# Patient Record
Sex: FEMALE | Race: WHITE | Employment: UNEMPLOYED | URBAN - METROPOLITAN AREA
[De-identification: names, ages, dates, MRNs, and addresses within clinical notes are randomized per-mention and may not be internally consistent; named-entity substitution may affect disease eponyms.]

---

## 2019-06-12 ENCOUNTER — HOSPITAL ENCOUNTER (OUTPATIENT)
Dept: ONCOLOGY | Age: 49
Discharge: HOME | End: 2019-06-12

## 2019-06-12 ENCOUNTER — HOSPITAL ENCOUNTER (OUTPATIENT)
Dept: LAB | Age: 49
Discharge: HOME | End: 2019-06-12
Payer: MEDICARE

## 2019-06-12 ENCOUNTER — OFFICE VISIT (OUTPATIENT)
Dept: ONCOLOGY | Age: 49
End: 2019-06-12

## 2019-06-12 VITALS
TEMPERATURE: 97.2 F | RESPIRATION RATE: 16 BRPM | DIASTOLIC BLOOD PRESSURE: 95 MMHG | OXYGEN SATURATION: 99 % | SYSTOLIC BLOOD PRESSURE: 143 MMHG | BODY MASS INDEX: 41.62 KG/M2 | WEIGHT: 281 LBS | HEART RATE: 117 BPM | HEIGHT: 69 IN

## 2019-06-12 DIAGNOSIS — I82.90 VENOUS THROMBOEMBOLISM: ICD-10-CM

## 2019-06-12 DIAGNOSIS — D47.2 MONOCLONAL GAMMOPATHY OF UNDETERMINED SIGNIFICANCE: ICD-10-CM

## 2019-06-12 DIAGNOSIS — D68.61 ANTIPHOSPHOLIPID SYNDROME (HCC): Primary | ICD-10-CM

## 2019-06-12 DIAGNOSIS — D68.61 ANTIPHOSPHOLIPID SYNDROME (HCC): ICD-10-CM

## 2019-06-12 LAB
ALBUMIN SERPL-MCNC: 3.7 G/DL (ref 3.4–5)
ALBUMIN/GLOB SERPL: 1.2 {RATIO} (ref 0.8–1.7)
ALP SERPL-CCNC: 124 U/L (ref 45–117)
ALT SERPL-CCNC: 18 U/L (ref 13–56)
ANION GAP SERPL CALC-SCNC: 9 MMOL/L (ref 3–18)
AST SERPL-CCNC: 11 U/L (ref 15–37)
BASO+EOS+MONOS # BLD AUTO: 0.9 K/UL (ref 0–2.3)
BASO+EOS+MONOS NFR BLD AUTO: 11 % (ref 0.1–17)
BILIRUB SERPL-MCNC: 0.6 MG/DL (ref 0.2–1)
BUN SERPL-MCNC: 27 MG/DL (ref 7–18)
BUN/CREAT SERPL: 19 (ref 12–20)
CALCIUM SERPL-MCNC: 8.6 MG/DL (ref 8.5–10.1)
CHLORIDE SERPL-SCNC: 108 MMOL/L (ref 100–108)
CO2 SERPL-SCNC: 23 MMOL/L (ref 21–32)
CREAT SERPL-MCNC: 1.42 MG/DL (ref 0.6–1.3)
DIFFERENTIAL METHOD BLD: ABNORMAL
ERYTHROCYTE [DISTWIDTH] IN BLOOD BY AUTOMATED COUNT: 13.7 % (ref 11.5–14.5)
GLOBULIN SER CALC-MCNC: 3 G/DL (ref 2–4)
GLUCOSE SERPL-MCNC: 86 MG/DL (ref 74–99)
HCT VFR BLD AUTO: 35.2 % (ref 36–48)
HGB BLD-MCNC: 11.4 G/DL (ref 12–16)
LYMPHOCYTES # BLD: 1.9 K/UL (ref 1.1–5.9)
LYMPHOCYTES NFR BLD: 22 % (ref 14–44)
MCH RBC QN AUTO: 29.2 PG (ref 25–35)
MCHC RBC AUTO-ENTMCNC: 32.4 G/DL (ref 31–37)
MCV RBC AUTO: 90.3 FL (ref 78–102)
NEUTS SEG # BLD: 6 K/UL (ref 1.8–9.5)
NEUTS SEG NFR BLD: 68 % (ref 40–70)
PLATELET # BLD AUTO: 293 K/UL (ref 140–440)
POTASSIUM SERPL-SCNC: 4 MMOL/L (ref 3.5–5.5)
PROT SERPL-MCNC: 6.7 G/DL (ref 6.4–8.2)
RBC # BLD AUTO: 3.9 M/UL (ref 4.1–5.1)
SODIUM SERPL-SCNC: 140 MMOL/L (ref 136–145)
WBC # BLD AUTO: 8.8 K/UL (ref 4.5–13)

## 2019-06-12 PROCEDURE — 85303 CLOT INHIBIT PROT C ACTIVITY: CPT

## 2019-06-12 PROCEDURE — 85306 CLOT INHIBIT PROT S FREE: CPT

## 2019-06-12 PROCEDURE — 85270 CLOT FACTOR XI PTA: CPT

## 2019-06-12 PROCEDURE — 80053 COMPREHEN METABOLIC PANEL: CPT

## 2019-06-12 PROCEDURE — 85302 CLOT INHIBIT PROT C ANTIGEN: CPT

## 2019-06-12 PROCEDURE — 85250 CLOT FACTOR IX PTC/CHRSTMAS: CPT

## 2019-06-12 PROCEDURE — 85305 CLOT INHIBIT PROT S TOTAL: CPT

## 2019-06-12 PROCEDURE — 85300 ANTITHROMBIN III ACTIVITY: CPT

## 2019-06-12 PROCEDURE — 36415 COLL VENOUS BLD VENIPUNCTURE: CPT

## 2019-06-12 PROCEDURE — 86147 CARDIOLIPIN ANTIBODY EA IG: CPT

## 2019-06-12 PROCEDURE — 83883 ASSAY NEPHELOMETRY NOT SPEC: CPT

## 2019-06-12 PROCEDURE — 84165 PROTEIN E-PHORESIS SERUM: CPT

## 2019-06-12 NOTE — PATIENT INSTRUCTIONS
Learning About Monoclonal Gammopathy of Unknown Significance (MGUS) What is MGUS? Monoclonal gammopathy of unknown significance (MGUS) is a blood condition. The blood is made of many kinds of cells, including red blood cells, platelets, and white blood cells. With MGUS, a type of white blood cell called a plasma cell makes too much of the \"M\" (for \"monoclonal\") protein in the blood. Most people with MGUS are fine for many years. MGUS seldom causes symptoms or major health problems. In rare cases, MGUS may turn into multiple myeloma. This is a cancer of plasma cells in the blood that can cause bone weakness. Some people with MGUS may also have a higher risk for osteoporosis, in which bones become thin and weak. MGUS is sometimes seen in younger people, but is more common in people as they get older. It's seen most often in those over the age of 79. How is MGUS diagnosed? MGUS is often found by chance when blood tests are done for other reasons. If you have high levels of a certain protein in your blood, your doctor may order more tests. Blood tests can help identify the protein. The protein is sometimes found in the urine, so you may get a urine test too. Other tests may be done if your doctor thinks you might have a medical problem. In some cases, a bone marrow biopsy may be done to rule out a problem like multiple myeloma. How is it treated? Most people with MGUS don't need any treatment. But you may need regular physical exams, blood tests, and urine tests to make sure that MGUS isn't progressing to a medical problem. Follow-up care is a key part of your treatment and safety. Be sure to make and go to all appointments, and call your doctor if you are having problems. It's also a good idea to know your test results and keep a list of the medicines you take. Where can you learn more? Go to http://andrew-yanick.info/. Enter A917 in the search box to learn more about \"Learning About Monoclonal Gammopathy of Unknown Significance (MGUS). \" 
Current as of: May 6, 2018 Content Version: 11.9 © 7104-6300 [employer]. Care instructions adapted under license by [employer] (which disclaims liability or warranty for this information). If you have questions about a medical condition or this instruction, always ask your healthcare professional. [place] [address] any warranty or liability for your use of this information.

## 2019-06-13 PROBLEM — S42.209A CLOSED FRACTURE OF PROXIMAL END OF HUMERUS: Status: ACTIVE | Noted: 2019-01-11

## 2019-06-13 PROBLEM — S82.839A FRACTURE OF DISTAL END OF FIBULA: Status: ACTIVE | Noted: 2019-01-11

## 2019-06-13 RX ORDER — ONDANSETRON 4 MG/1
TABLET, ORALLY DISINTEGRATING ORAL
Status: ON HOLD | COMMUNITY
Start: 2019-04-24 | End: 2020-08-31

## 2019-06-13 RX ORDER — NALOXONE HYDROCHLORIDE 4 MG/.1ML
SPRAY NASAL
Status: ON HOLD | COMMUNITY
Start: 2019-03-11 | End: 2020-08-31

## 2019-06-13 RX ORDER — BUPRENORPHINE 15 UG/H
PATCH TRANSDERMAL
COMMUNITY
End: 2020-09-09

## 2019-06-13 RX ORDER — BUPRENORPHINE 15 UG/H
PATCH TRANSDERMAL
Refills: 0 | Status: ON HOLD | COMMUNITY
Start: 2019-06-02 | End: 2020-08-31 | Stop reason: SDUPTHER

## 2019-06-13 RX ORDER — IBUPROFEN AND FAMOTIDINE 26.6; 8 MG/1; MG/1
TABLET, FILM COATED ORAL
Status: ON HOLD | COMMUNITY
End: 2020-08-31

## 2019-06-13 RX ORDER — OSPEMIFENE 60 MG/1
TABLET, FILM COATED ORAL
COMMUNITY
Start: 2019-04-15 | End: 2019-10-02 | Stop reason: SDUPTHER

## 2019-06-13 RX ORDER — PREDNISONE 5 MG/1
TABLET ORAL
COMMUNITY

## 2019-06-13 RX ORDER — PREDNISONE 5 MG/1
TABLET ORAL
COMMUNITY
Start: 2019-06-07

## 2019-06-13 RX ORDER — OXYCODONE HYDROCHLORIDE 5 MG/1
TABLET ORAL
Status: ON HOLD | COMMUNITY
Start: 2019-04-24 | End: 2020-08-31

## 2019-06-13 RX ORDER — CARVEDILOL 12.5 MG/1
TABLET ORAL 2 TIMES DAILY
Status: ON HOLD | COMMUNITY
Start: 2019-06-11 | End: 2020-09-09 | Stop reason: SDUPTHER

## 2019-06-13 RX ORDER — ACETAMINOPHEN 160 MG/5ML
SUSPENSION, ORAL (FINAL DOSE FORM) ORAL
Status: ON HOLD | COMMUNITY
Start: 2019-04-24 | End: 2020-08-31

## 2019-06-13 RX ORDER — ALBUTEROL SULFATE 90 UG/1
AEROSOL, METERED RESPIRATORY (INHALATION)
COMMUNITY

## 2019-06-13 RX ORDER — BENZONATATE 100 MG/1
CAPSULE ORAL
Status: ON HOLD | COMMUNITY
End: 2021-03-19

## 2019-06-13 RX ORDER — OXYCODONE AND ACETAMINOPHEN 10; 325 MG/1; MG/1
TABLET ORAL
Status: ON HOLD | COMMUNITY
End: 2020-08-31

## 2019-06-13 RX ORDER — TRIAZOLAM 0.25 MG/1
TABLET ORAL
COMMUNITY
End: 2019-10-02 | Stop reason: SDUPTHER

## 2019-06-13 RX ORDER — MYCOPHENOLATE MOFETIL 500 MG/1
1000 TABLET ORAL 2 TIMES DAILY
COMMUNITY

## 2019-06-13 RX ORDER — DICLOFENAC SODIUM 10 MG/G
GEL TOPICAL
COMMUNITY
Start: 2019-06-07 | End: 2019-10-02 | Stop reason: SDUPTHER

## 2019-06-13 RX ORDER — CEPHALEXIN 500 MG/1
CAPSULE ORAL
Status: ON HOLD | COMMUNITY
End: 2020-08-31

## 2019-06-13 RX ORDER — DIAZEPAM 5 MG/1
TABLET ORAL
Status: ON HOLD | COMMUNITY
Start: 2019-03-22 | End: 2020-08-31

## 2019-06-13 RX ORDER — CYCLOSPORINE 100 MG/1
75 CAPSULE, GELATIN COATED ORAL 2 TIMES DAILY
COMMUNITY

## 2019-06-13 RX ORDER — ATENOLOL 25 MG/1
TABLET ORAL
Status: ON HOLD | COMMUNITY
End: 2020-08-31

## 2019-06-13 RX ORDER — PANTOPRAZOLE SODIUM 40 MG/1
TABLET, DELAYED RELEASE ORAL
COMMUNITY

## 2019-06-13 RX ORDER — TRIAZOLAM 0.25 MG/1
TABLET ORAL
COMMUNITY
Start: 2019-06-10 | End: 2021-02-23 | Stop reason: CLARIF

## 2019-06-13 RX ORDER — APIXABAN 5 MG/1
TABLET, FILM COATED ORAL
COMMUNITY
Start: 2019-06-04 | End: 2019-10-02 | Stop reason: SDUPTHER

## 2019-06-13 RX ORDER — ACETAMINOPHEN 500 MG/1
TABLET, FILM COATED ORAL
Status: ON HOLD | COMMUNITY
Start: 2019-04-24 | End: 2020-08-31

## 2019-06-13 RX ORDER — DICLOFENAC SODIUM 10 MG/G
GEL TOPICAL
Status: ON HOLD | COMMUNITY
End: 2020-08-31

## 2019-06-13 NOTE — PROGRESS NOTES
Hematology/Oncology Consultation Note    Name: Valentine Gutierres  Date: 2019  : 1970    No primary care provider on file. Ms. Ej Castellon  is a 46 y.o. woman with a history of recurrent venous thromboembolism and antiphospholipid syndrome. She is here to establish continuity of care. Subjective:     Chief complaint: Recurrent venous thromboembolism and antiphospholipid syndrome. The patient also has a monoclonal gammopathy    History of present illness:  Ms. Ej Castellon is a 67-year-old woman who states that she was diagnosed with spontaneous right lower extremity DVT and pulmonary emboli in . At the time she states the test revealed that she had antiphospholipid syndrome. She states that she had an evaluation done initially at [facility] and subsequently was found to have lupus and fibromyalgia. She has been on chronic anticoagulation since that time. 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  Currently she is taking Eliquis 5 mg twice daily. Since the diagnosis of antiphospholipid syndrome she was diagnosed with monoclonal gammopathy of undetermined significance. This is been monitored at regular intervals in the past.  She occasionally does have some bruising of the lower extremities but has not experienced any overt bleeding. She has not been on any medication for fibromyalgia. Today she is here to establish continuity care. She cannot recall whether or not she has had a complete evaluation to rule out a genetically inherited thrombophilia. History reviewed. No pertinent past medical history. Not on File    History reviewed. No pertinent surgical history.     Social History     Socioeconomic History    Marital status: UNKNOWN     Spouse name: Not on file    Number of children: Not on file    Years of education: Not on file    Highest education level: Not on file   Occupational History    Not on file Social Needs    Financial resource strain: Not on file    Food insecurity:     Worry: Not on file     Inability: Not on file    Transportation needs:     Medical: Not on file     Non-medical: Not on file   Tobacco Use    Smoking status: Not on file   Substance and Sexual Activity    Alcohol use: Not on file    Drug use: Not on file    Sexual activity: Not on file   Lifestyle    Physical activity:     Days per week: Not on file     Minutes per session: Not on file    Stress: Not on file   Relationships    Social connections:     Talks on phone: Not on file     Gets together: Not on file     Attends [employer] service: Not on file     Active member of club or organization: Not on file     Attends meetings of clubs or organizations: Not on file     Relationship status: Not on file    Intimate partner violence:     Fear of current or ex partner: Not on file     Emotionally abused: Not on file     Physically abused: Not on file     Forced sexual activity: Not on file   Other Topics Concern    Not on file   Social History Narrative    Not on file       History reviewed. No pertinent family history. Review of Systems    General ROS:The patient has complaints of some bruising in the lower extremities and occasional fatigue. She has mild discomfort due to her underlying fibromyalgia. Psychological ROS: patient denies having any psychological symptoms such as hallucinations, depression or anxiety. Ophthalmic ROS:the patient denies having any visual impairment or eye discomfort. ENT ROS: there are no abnormalities reported. Allergy and Immunology ROS:the patient denies having any seasonal allergies or allergies to medications other than those already outlined above. Hematological and Lymphatic ROS: the patient denies having bleeding but does have occasional bruising in the lower extremities. Endocrine ROS: the patient denies having any heat or cold intolerance.   There is no history of diabetes or thyroid disorders. Breast ROS: the patient denies having any history of breast mass, nipple discharge, or lumps. Respiratory ROS:the patient denies having any cough, shortness of breath, or dyspnea on exertion. Cardiovascular ROS: there are no complaints of chest pain, palpitations, chest pounding, or dyspnea on exertion. Gastrointestinal ROS: the patient denies having nausea, emesis, diarrhea, constipation, or blood in the stool. Genito-Urinary ROS: the patient denies having urinary urgency, frequency, or dysuria. Musculoskeletal ROS: with the exception of mild arthralgias the patient has no other musculoskeletal complaints. Neurological ROS: the patient denies having any numbness, tingling, or neurologic deficits. Dermatological ROS:patient denies having any unexplained rash, skin ulcerations, or hives. Objective:     Visit Vitals  BP (!) 143/95   Pulse (!) 117   Temp 97.2 °F (36.2 °C) (Oral)   Resp 16   Ht 5' 9\" (1.753 m)   Wt 127.5 kg (281 lb)   SpO2 99%   BMI 41.50 kg/m²        ECOGPS=0  Physical Exam:   Gen. Appearance: the patient is in no acute distress. Skin: There is no evidence of bruise or rash. HEENT: The head is normocephalic and atraumatic. The conjunctiva and sclera are clear. Pupils are equal, round, reactive to light, and accommodation. The extraocular movements are intact. ENT reveals no oral mucosal lesions or ulcerations. Neck: Supple without lymphadenopathy or thyromegaly. Anterior chest wall and breast: Benign lungs: Clear to auscultation and percussion; there are no wheezes or rhonchi. Heart: Regular rate and rhythm; there are no murmurs, gallops, or rubs. Abdomen: Bowel sounds are present and normal.  There is no guarding, tenderness, or hepatosplenomegaly. Extremities: There is no clubbing, cyanosis, or edema. Neurologic: There are no focal neurologic deficits. Lymphatics: There is no palpable peripheral lymphadenopathy.     Lab data:  Lab data from 1/23/2018 shows a CBC with a WBC count 7.59, hemoglobin 11.9 g/dL, hematocrit 36.8%, MCV 93.9, and the platelet count was 764,691. SPEP from 6/7/2018 shows a monoclonal paraprotein level of 0.3 g/dL. The quantitative immunoglobulin showed an IgA level of 5 mg/dL, IgG was 637 mg/dL, and IgM was 141 mg/dL. Assessment:   Antiphospholipid syndrome: The patient reports that she is currently on chronic long-term anticoagulation in the form of Eliquis 5 mg twice daily. She occasionally has some bruising but no explained to the patient that we will need to evaluate her for other genetic inherited thrombophilic disorders since she cannot recall ever having this done. We have no outside records to confirm. Monoclonal gammopathy of undetermined significance: The most recent serum protein electrophoresis shows a stable monoclonal paraprotein level of approximately 0.3 g/dL. Plan:   Antiphospholipid syndrome: I have advised the patient to continue taking the Eliquis 5 mg twice daily. At this time I will do a complete assessment for genetically inherited thrombophilia. The Antithrombin panel, protein C profile, protein S profile, and homocysteine level will be obtained. Anticardiolipin antibodies including IgG, IgM, and IgA will be ordered. Factor IX and factor XI activity along with DNA analysis for factor V Leiden mutation, factor II mutation, and mutations in the MTHFR will be completed. I will plan to see the patient back in clinic in about 3 weeks to review lab data. Monoclonal gammopathy of undetermined significance. At this time I will recheck her SPEP and if there is a significant increase in her monoclonal paraprotein level she may be required to undergo a bone marrow biopsy to rule out progression to multiple myeloma. Follow-up in 3 weeks to review lab data.     Orders Placed This Encounter    COMPLETE CBC & AUTO DIFF WBC    METABOLIC PANEL, COMPREHENSIVE     Standing Status:   Future     Number of Occurrences:   1     Standing Expiration Date:   6/12/2020    PROTEIN ELECTROPHORESIS     Standing Status:   Future     Number of Occurrences:   1     Standing Expiration Date:   6/12/2020    PROTEIN S, FUNCTIONAL     Standing Status:   Future     Number of Occurrences:   1     Standing Expiration Date:   6/12/2020    FACTOR V LEIDEN     Standing Status:   Future     Number of Occurrences:   1     Standing Expiration Date:   6/12/2020    FACTOR II  DNA ANALYSIS     Standing Status:   Future     Number of Occurrences:   1     Standing Expiration Date:   6/12/2020    FREE LIGHT CHAINS, KAPPA/LAMBDA, QT     Standing Status:   Future     Number of Occurrences:   1     Standing Expiration Date:   6/12/2020    PROTEIN S ANTIGEN     Standing Status:   Future     Number of Occurrences:   1     Standing Expiration Date:   6/12/2020    PROTEIN C, TOTAL     Standing Status:   Future     Number of Occurrences:   1     Standing Expiration Date:   6/12/2020    PROTEIN C ACTIVITY     Standing Status:   Future     Number of Occurrences:   1     Standing Expiration Date:   6/12/2020    MTHFR MUTATION DETECTION     Standing Status:   Future     Number of Occurrences:   1     Standing Expiration Date:   6/12/2020    ANTITHROMBIN III PANEL     Standing Status:   Future     Number of Occurrences:   1     Standing Expiration Date:   6/12/2020    CARDIOLIPIN AB PANEL     Standing Status:   Future     Number of Occurrences:   1     Standing Expiration Date:   6/12/2020    FACTOR IX ACTIVITY     Standing Status:   Future     Number of Occurrences:   1     Standing Expiration Date:   6/11/2020    FACTOR XI ACTIVITY     Standing Status:   Future     Number of Occurrences:   1     Standing Expiration Date:   6/12/2020    InHouse CBC ([employer])     Standing Status:   Future     Number of Occurrences:   1     Standing Expiration Date:   6/19/2019           Shahrzad Cordero MD  6/12/2019      Please note:  This document has been produced using voice recognition software. Unrecognized errors in transcription may be present.

## 2019-06-14 LAB
ALBUMIN SERPL ELPH-MCNC: 3.4 G/DL (ref 2.9–4.4)
ALBUMIN/GLOB SERPL: 1.2 {RATIO} (ref 0.7–1.7)
ALPHA1 GLOB SERPL ELPH-MCNC: 0.2 G/DL (ref 0–0.4)
ALPHA2 GLOB SERPL ELPH-MCNC: 0.8 G/DL (ref 0.4–1)
B-GLOBULIN SERPL ELPH-MCNC: 0.9 G/DL (ref 0.7–1.3)
CARDIOLIPIN IGA SER IA-ACNC: <9 APL U/ML (ref 0–11)
CARDIOLIPIN IGG SER IA-ACNC: <9 GPL U/ML (ref 0–14)
CARDIOLIPIN IGM SER IA-ACNC: 16 MPL U/ML (ref 0–12)
GAMMA GLOB SERPL ELPH-MCNC: 0.8 G/DL (ref 0.4–1.8)
GLOBULIN SER CALC-MCNC: 2.8 G/DL (ref 2.2–3.9)
KAPPA LC FREE SER-MCNC: 22 MG/L (ref 3.3–19.4)
KAPPA LC FREE/LAMBDA FREE SER: 0.25 {RATIO} (ref 0.26–1.65)
LAMBDA LC FREE SERPL-MCNC: 87.7 MG/L (ref 5.7–26.3)
M PROTEIN SERPL ELPH-MCNC: 0.3 G/DL
PROT SERPL-MCNC: 6.2 G/DL (ref 6–8.5)

## 2019-06-15 LAB
AT III AG PPP IA-ACNC: 117 % (ref 72–124)
AT III PPP CHRO-ACNC: 146 % (ref 75–135)
FACT IX ACT/NOR PPP: 136 % (ref 60–177)
FACT XI ACT/NOR PPP: 119 % (ref 60–150)
PROT C AG PPP IA-ACNC: 104 % (ref 60–150)
PROT C PPP-ACNC: 126 % (ref 73–180)
PROT S ACT/NOR PPP: 104 % (ref 63–140)
PROT S ACT/NOR PPP: 131 % (ref 57–157)
PROT S PPP-ACNC: 114 % (ref 60–150)

## 2019-06-17 LAB
F2 GENE MUT ANL BLD/T: NORMAL
F5 GENE MUT ANL BLD/T: NORMAL

## 2019-06-24 LAB — MTHFR GENE MUT ANL BLD/T: NORMAL

## 2019-06-26 ENCOUNTER — OFFICE VISIT (OUTPATIENT)
Dept: ONCOLOGY | Age: 49
End: 2019-06-26

## 2019-06-26 VITALS
WEIGHT: 281 LBS | SYSTOLIC BLOOD PRESSURE: 137 MMHG | TEMPERATURE: 99.1 F | OXYGEN SATURATION: 98 % | HEART RATE: 86 BPM | HEIGHT: 69 IN | RESPIRATION RATE: 16 BRPM | BODY MASS INDEX: 41.62 KG/M2 | DIASTOLIC BLOOD PRESSURE: 82 MMHG

## 2019-06-26 DIAGNOSIS — I82.90 VENOUS THROMBOEMBOLISM: ICD-10-CM

## 2019-06-26 DIAGNOSIS — D68.61 ANTIPHOSPHOLIPID SYNDROME (HCC): Primary | ICD-10-CM

## 2019-06-26 DIAGNOSIS — D47.2 MONOCLONAL GAMMOPATHY OF UNDETERMINED SIGNIFICANCE: ICD-10-CM

## 2019-06-26 NOTE — PROGRESS NOTES
Hematology/medical oncology progress note    6/26/2019  Ed Berrios  YOB: 1970    Diagnosis: Antiphospholipid syndrome and recurrent DVT    Ms. Ej Castellon is a 47-year-old woman who has a history of recurrent deep vein thrombosis. She also has an antiphospholipid syndrome. Patient has previously undergone a kidney transplant as well. Recently she presented here to establish continuity of care. On 6/12/2018 the CBC revealed that she had a WBC count of 8.8, hemoglobin was 11.4 g/dL, hematocrit was 35.2%, and a platelet count was 767,719. The comprehensive metabolic panel showed that her BUN was 27, creatinine was 1.42, and her liver enzymes were normal except for slightly elevated alkaline phosphatase of 124. Thrombophilia work-up showed that Antithrombin activity and antigen levels were normal at 146% and 117% respectively. Anticardiolipin antibody IgG was negative at less than 9, IgA 80 was negative for less than 9 and the IgM was indeterminate at 16. Factor XI activity was normal at 119%. A DNA analysis for factor II abnormality and factor V abnormalities were both negative. The factor IX activity was 136%. The free kappa light chains were elevated at 22 mg/L and the free lambda light chains were elevated at 87.7 mg/L. The kappa/lambda ratio was 0.25. 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 This combination has no risk for increased venous thromboembolism. Her protein C functional level was normal at 126%, the protein C antigen was 104%, and the functional protein S was 104% with a total protein S of 114% and a free protein S of 131%. She has a known monoclonal gammopathy and the SPEP revealed that she has a monoclonal paraprotein level that measures at 0.3 g/dL.   I have explained to the patient that although she does not have a genetically inherited ongoing thrombophilic disorder. Her antiphospholipid antibody level is indeterminate. She does have a monoclonal gammopathy and a history of recurrent DVT. For these reasons she should remain on anticoagulation indefinitely. She will continue to take Eliquis 5 mg p.o. twice daily. I will send a new prescription to her pharmacy and will plan to see her in clinic in 3-month intervals. She had her questions answered to her satisfaction. Total time 30 minutes, greater than 50% of the time was in counseling and coordination of care. Dionte Narayan MD, [facility]

## 2019-10-02 ENCOUNTER — OFFICE VISIT (OUTPATIENT)
Dept: ONCOLOGY | Age: 49
End: 2019-10-02

## 2019-10-02 ENCOUNTER — HOSPITAL ENCOUNTER (OUTPATIENT)
Dept: LAB | Age: 49
Discharge: HOME | End: 2019-10-02
Payer: MEDICARE

## 2019-10-02 VITALS
OXYGEN SATURATION: 96 % | DIASTOLIC BLOOD PRESSURE: 92 MMHG | RESPIRATION RATE: 12 BRPM | SYSTOLIC BLOOD PRESSURE: 141 MMHG | HEIGHT: 69 IN | HEART RATE: 91 BPM | BODY MASS INDEX: 41.03 KG/M2 | TEMPERATURE: 96.4 F | WEIGHT: 277 LBS

## 2019-10-02 DIAGNOSIS — D47.2 MONOCLONAL GAMMOPATHY OF UNDETERMINED SIGNIFICANCE: Primary | ICD-10-CM

## 2019-10-02 DIAGNOSIS — I82.90 VENOUS THROMBOEMBOLISM: ICD-10-CM

## 2019-10-02 DIAGNOSIS — D68.61 ANTIPHOSPHOLIPID SYNDROME (HCC): ICD-10-CM

## 2019-10-02 DIAGNOSIS — D47.2 MONOCLONAL GAMMOPATHY OF UNDETERMINED SIGNIFICANCE: ICD-10-CM

## 2019-10-02 DIAGNOSIS — J02.9 SORE THROAT: ICD-10-CM

## 2019-10-02 LAB
ALBUMIN SERPL-MCNC: 3.5 G/DL (ref 3.4–5)
ALBUMIN/GLOB SERPL: 1.2 {RATIO} (ref 0.8–1.7)
ALP SERPL-CCNC: 112 U/L (ref 45–117)
ALT SERPL-CCNC: 20 U/L (ref 13–56)
ANION GAP SERPL CALC-SCNC: 9 MMOL/L (ref 3–18)
AST SERPL-CCNC: 11 U/L (ref 10–38)
BASOPHILS # BLD: 0 K/UL (ref 0–0.1)
BASOPHILS NFR BLD: 0 % (ref 0–2)
BILIRUB SERPL-MCNC: 0.7 MG/DL (ref 0.2–1)
BUN SERPL-MCNC: 29 MG/DL (ref 7–18)
BUN/CREAT SERPL: 23 (ref 12–20)
CALCIUM SERPL-MCNC: 8.6 MG/DL (ref 8.5–10.1)
CHLORIDE SERPL-SCNC: 110 MMOL/L (ref 100–111)
CO2 SERPL-SCNC: 22 MMOL/L (ref 21–32)
CREAT SERPL-MCNC: 1.27 MG/DL (ref 0.6–1.3)
DIFFERENTIAL METHOD BLD: ABNORMAL
EOSINOPHIL # BLD: 0.2 K/UL (ref 0–0.4)
EOSINOPHIL NFR BLD: 3 % (ref 0–5)
ERYTHROCYTE [DISTWIDTH] IN BLOOD BY AUTOMATED COUNT: 15.6 % (ref 11.6–14.5)
GLOBULIN SER CALC-MCNC: 3 G/DL (ref 2–4)
GLUCOSE SERPL-MCNC: 107 MG/DL (ref 74–99)
HCT VFR BLD AUTO: 37.2 % (ref 35–45)
HGB BLD-MCNC: 11.2 G/DL (ref 12–16)
LYMPHOCYTES # BLD: 0.9 K/UL (ref 0.9–3.6)
LYMPHOCYTES NFR BLD: 14 % (ref 21–52)
MCH RBC QN AUTO: 29 PG (ref 24–34)
MCHC RBC AUTO-ENTMCNC: 30.1 G/DL (ref 31–37)
MCV RBC AUTO: 96.4 FL (ref 74–97)
MONOCYTES # BLD: 0.9 K/UL (ref 0.05–1.2)
MONOCYTES NFR BLD: 13 % (ref 3–10)
NEUTS SEG # BLD: 4.6 K/UL (ref 1.8–8)
NEUTS SEG NFR BLD: 70 % (ref 40–73)
PLATELET # BLD AUTO: 298 K/UL (ref 135–420)
PMV BLD AUTO: 10.4 FL (ref 9.2–11.8)
POTASSIUM SERPL-SCNC: 4.8 MMOL/L (ref 3.5–5.5)
PROT SERPL-MCNC: 6.5 G/DL (ref 6.4–8.2)
RBC # BLD AUTO: 3.86 M/UL (ref 4.2–5.3)
SODIUM SERPL-SCNC: 141 MMOL/L (ref 136–145)
WBC # BLD AUTO: 6.5 K/UL (ref 4.6–13.2)

## 2019-10-02 PROCEDURE — 84165 PROTEIN E-PHORESIS SERUM: CPT

## 2019-10-02 PROCEDURE — 85025 COMPLETE CBC W/AUTO DIFF WBC: CPT

## 2019-10-02 PROCEDURE — 83883 ASSAY NEPHELOMETRY NOT SPEC: CPT

## 2019-10-02 PROCEDURE — 80053 COMPREHEN METABOLIC PANEL: CPT

## 2019-10-02 PROCEDURE — 36415 COLL VENOUS BLD VENIPUNCTURE: CPT

## 2019-10-02 RX ORDER — POLYETHYLENE GLYCOL 3350 17 G/17G
POWDER, FOR SOLUTION ORAL
Status: ON HOLD | COMMUNITY
Start: 2019-09-26 | End: 2020-08-31

## 2019-10-02 RX ORDER — TIZANIDINE 4 MG/1
TABLET ORAL AS NEEDED
COMMUNITY
Start: 2019-09-23

## 2019-10-02 RX ORDER — LISINOPRIL 40 MG/1
TABLET ORAL
COMMUNITY
Start: 2019-08-08 | End: 2020-09-09

## 2019-10-02 NOTE — PROGRESS NOTES
ROOM #     Benjy Medley presents today for   Chief Complaint   Patient presents with    Follow-up     Antiphospholipid syndrome      Visit Vitals  BP (!) 141/92   Pulse 91   Temp 96.4 °F (35.8 °C) (Oral)   Resp 12   Ht 5' 9\" (1.753 m)   Wt 277 lb (125.6 kg)   SpO2 96%   BMI 40.91 kg/m²       Sherri Willisjose juan Garsia preferred language for health care discussion is english/other. Is someone accompanying this pt? no    Is the patient using any DME equipment during [address]? no    Depression Screening:  3 most recent PHQ Screens 6/26/2019 6/12/2019   Little interest or pleasure in doing things Not at all Several days   Feeling down, depressed, irritable, or hopeless Not at all Not at all   Total Score PHQ 2 0 1       Learning Assessment:  Learning Assessment 6/26/2019   PRIMARY LEARNER Patient   PRIMARY LANGUAGE ENGLISH   LEARNER PREFERENCE PRIMARY LISTENING   ANSWERED BY patient   RELATIONSHIP SELF       Abuse Screening:  Abuse Screening Questionnaire 6/12/2019   Do you ever feel afraid of your partner? N   Are you in a relationship with someone who physically or mentally threatens you? N   Is it safe for you to go home? Y       Fall Risk  Fall Risk Assessment, last 12 mths 6/12/2019   Able to walk? Yes   Fall in past 12 months? No       Health Maintenance reviewed and discussed per provider. Yes    Benjy Medley is due for   Health Maintenance Due   Topic Date Due    Pneumococcal 0-64 years (1 of 3 - PCV13) 10/25/1974    DTaP/Tdap/Td series (1 - Tdap) 10/25/1989    PAP AKA CERVICAL CYTOLOGY  10/25/1989    Shingrix Vaccine Age 50> (1 of 2) 10/25/2018    BREAST CANCER SCRN MAMMOGRAM  10/25/2018    FOBT Q 1 YEAR AGE 50-75  10/25/2018    MEDICARE YEARLY EXAM  06/12/2019    Influenza Age 9 to Adult  08/01/2019         Please order/place referral if appropriate. Advance Directive:  1. Do you have an advance directive in place? Patient Reply: no    2.  If not, would you like material regarding how to put one in place? Patient Reply: no    Coordination of Care:  1. Have you been to the ER, urgent care clinic since your last visit? Hospitalized since your last visit? no    2. Have you seen or consulted any other health care providers outside of the [address] [place] [address] since your last visit? Include any pap smears or colon screening.  no

## 2019-10-02 NOTE — PATIENT INSTRUCTIONS
Learning About Monoclonal Gammopathy of Unknown Significance (MGUS)  What is MGUS? Monoclonal gammopathy of unknown significance (MGUS) is a blood condition. The blood is made of many kinds of cells, including red blood cells, platelets, and white blood cells. With MGUS, a type of white blood cell called a plasma cell makes too much of the \"M\" (for \"monoclonal\") protein in the blood. Most people with MGUS are fine for many years. MGUS seldom causes symptoms or major health problems. In rare cases, MGUS may turn into multiple myeloma. This is a cancer of plasma cells in the blood that can cause bone weakness. Some people with MGUS may also have a higher risk for osteoporosis, in which bones become thin and weak. MGUS is sometimes seen in younger people, but is more common in people as they get older. It's seen most often in those over the age of 79. How is MGUS diagnosed? MGUS is often found by chance when blood tests are done for other reasons. If you have high levels of a certain protein in your blood, your doctor may order more tests. Blood tests can help identify the protein. The protein is sometimes found in the urine, so you may get a urine test too. Other tests may be done if your doctor thinks you might have a medical problem. In some cases, a bone marrow biopsy may be done to rule out a problem like multiple myeloma. How is it treated? Most people with MGUS don't need any treatment. But you may need regular physical exams, blood tests, and urine tests to make sure that MGUS isn't progressing to a medical problem. Follow-up care is a key part of your treatment and safety. Be sure to make and go to all appointments, and call your doctor if you are having problems. It's also a good idea to know your test results and keep a list of the medicines you take. Where can you learn more? Go to http://andrew-yanick.info/.   Enter A917 in the search box to learn more about \"Learning About Monoclonal Gammopathy of Unknown Significance (MGUS). \"  Current as of: March 28, 2019  Content Version: 12.2  © 6352-5489 [employer]. Care instructions adapted under license by [employer] (which disclaims liability or warranty for this information). If you have questions about a medical condition or this instruction, always ask your healthcare professional. [address] any warranty or liability for your use of this information. Learning About Monoclonal Gammopathy of Unknown Significance (MGUS)  What is MGUS? Monoclonal gammopathy of unknown significance (MGUS) is a blood condition. The blood is made of many kinds of cells, including red blood cells, platelets, and white blood cells. With MGUS, a type of white blood cell called a plasma cell makes too much of the \"M\" (for \"monoclonal\") protein in the blood. Most people with MGUS are fine for many years. MGUS seldom causes symptoms or major health problems. In rare cases, MGUS may turn into multiple myeloma. This is a cancer of plasma cells in the blood that can cause bone weakness. Some people with MGUS may also have a higher risk for osteoporosis, in which bones become thin and weak. MGUS is sometimes seen in younger people, but is more common in people as they get older. It's seen most often in those over the age of 79. How is MGUS diagnosed? MGUS is often found by chance when blood tests are done for other reasons. If you have high levels of a certain protein in your blood, your doctor may order more tests. Blood tests can help identify the protein. The protein is sometimes found in the urine, so you may get a urine test too. Other tests may be done if your doctor thinks you might have a medical problem. In some cases, a bone marrow biopsy may be done to rule out a problem like multiple myeloma. How is it treated? Most people with MGUS don't need any treatment.  But you may need regular physical exams, blood tests, and urine tests to make sure that MGUS isn't progressing to a medical problem. Follow-up care is a key part of your treatment and safety. Be sure to make and go to all appointments, and call your doctor if you are having problems. It's also a good idea to know your test results and keep a list of the medicines you take. Where can you learn more? Go to http://andrew-yanick.info/. Enter A917 in the search box to learn more about \"Learning About Monoclonal Gammopathy of Unknown Significance (MGUS). \"  Current as of: March 28, 2019  Content Version: 12.2  © 9910-6712 [facility], "[employer]. Care instructions adapted under license by [employer] (which disclaims liability or warranty for this information). If you have questions about a medical condition or this instruction, always ask your healthcare professional. [place] [address] any warranty or liability for your use of this information.

## 2019-10-02 NOTE — PROGRESS NOTES
Hematology/Oncology  Progress Note    Name: Jossue Stewart  Date: 10/2/2019  : 1970    None     Ms. Nicole Chavez is a 46y.o. year old female who was seen for Antiphospholipid syndrome and recurrent DVT. Current therapy: Eliquis 5 mg po twice daily      Subjective:   Ms. Nicole Chavez is a 12-year-old woman who has a history of antiphospholipid syndrome and she has suffered from recurrent DVT. Currently she is on long-term anticoagulation to reduce current risk for further venous thromboembolic episodes. She is here today for follow up assessment to rule out any evidence of liver impairment since she is on lifelong anticoagulation therapy. Apparently she saw her PCP yesterday because she is having signs and symptoms of an upper respiratory infection, with running nose, and sore throat. Additionally, she occasional experiences fever because of her underlying lupus lupus. She has no new physical complaints to report at this time, beyond what is already mentioned. Past medical history, family history, and social history: these were reviewed and remains unchanged.     Past Medical History:   Diagnosis Date    Anemia     Depression     Hypertension      Past Surgical History:   Procedure Laterality Date    HX HYSTERECTOMY      HX RENAL TRANSPLANT       Social History     Socioeconomic History    Marital status:      Spouse name: Not on file    Number of children: Not on file    Years of education: Not on file    Highest education level: Not on file   Occupational History    Not on file   Social Needs    Financial resource strain: Not on file    Food insecurity:     Worry: Not on file     Inability: Not on file    Transportation needs:     Medical: Not on file     Non-medical: Not on file   Tobacco Use    Smoking status: Never Smoker    Smokeless tobacco: Never Used   Substance and Sexual Activity    Alcohol use: Yes     Comment: occ    Drug use: Never    Sexual activity: Yes Lifestyle    Physical activity:     Days per week: Not on file     Minutes per session: Not on file    Stress: Not on file   Relationships    Social connections:     Talks on phone: Not on file     Gets together: Not on file     Attends Restorationist service: Not on file     Active member of club or organization: Not on file     Attends meetings of clubs or organizations: Not on file     Relationship status: Not on file    Intimate partner violence:     Fear of current or ex partner: Not on file     Emotionally abused: Not on file     Physically abused: Not on file     Forced sexual activity: Not on file   Other Topics Concern    Not on file   Social History Narrative    Not on file     Family History   Problem Relation Age of Onset    Hypertension Mother     Diabetes Father     Heart Disease Father     Hypertension Father     Stroke Father      Current Outpatient Medications   Medication Sig Dispense Refill    lisinopril (PRINIVIL, ZESTRIL) 40 mg tablet       polyethylene glycol (MIRALAX) 17 gram/dose powder       tiZANidine (ZANAFLEX) 4 mg tablet       apixaban (ELIQUIS) 5 mg tablet Take 1 Tab by mouth two (2) times a day. 180 Tab 3    diclofenac (VOLTAREN) 1 % gel diclofenac 1 % topical gel   APPLY ONE TO TWO GRAM TOPICALLY THREE TIMES A DAY AS DIRECTED      esomeprazole magnesium (NEXIUM PO) Take 40 mg by mouth.  carvedilol (COREG) 12.5 mg tablet two (2) times a day.  albuterol (PROAIR HFA) 90 mcg/actuation inhaler ProAir HFA 90 mcg/actuation aerosol inhaler      TYLENOL EXTRA STRENGTH 500 mg tablet       cycloSPORINE (SANDIMMUNE) 100 mg capsule cyclosporine 100 mg capsule   TAKE ONE CAPSULE BY MOUTH TWICE A DAY      mycophenolate (CELLCEPT) 500 mg tablet 1,000 mg two (2) times a day.       ospemifene (OSPHENA) 60 mg tab tablet Osphena 60 mg tablet   TAKE ONE TABLET BY MOUTH ONE TIME DAILY      buprenorphine 15 mcg/hour ptwk buprenorphine 15 mcg/hour weekly transdermal patch   APPLY ONE PATCH TO THE SKIN ONCE A WEEK      pantoprazole (PROTONIX) 40 mg tablet pantoprazole 40 mg tablet,delayed release      predniSONE (DELTASONE) 5 mg tablet       ibuprofen-famotidine 800-26.6 mg tab ibuprofen 800 mg tablet   Take 1 tablet 3 times a day by oral route as needed.  predniSONE (STERAPRED) 5 mg dose pack prednisone 5 mg tablet   TAKE ONE TO TWO TABLETS BY MOUTH ONE TIME DAILY AS NEEDED FOR FLARE      diazePAM (VALIUM) 5 mg tablet       ondansetron (ZOFRAN ODT) 4 mg disintegrating tablet       benzonatate (TESSALON) 100 mg capsule benzonatate 100 mg capsule      atenolol (TENORMIN) 25 mg tablet atenolol 25 mg tablet      cephALEXin (KEFLEX) 500 mg capsule cephalexin 500 mg capsule      oxyCODONE-acetaminophen (PERCOCET 10)  mg per tablet oxycodone-acetaminophen 10 mg-325 mg tablet   TAKE ONE TABLET BY MOUTH THREE TIMES A DAY AS NEEDED FOR 28 DAYS      buprenorphine 15 mcg/hour ptwk APPLY 1 PATCH TO SKIN WEEKLY  0    oxyCODONE IR (ROXICODONE) 5 mg immediate release tablet       NARCAN 4 mg/actuation nasal spray       triazolam (HALCION) 0.25 mg tablet       VITAMIN C 500 mg tablet          Review of Systems  Constitutional: The patient has no acute distress or discomfort. HEENT: The patient denies recent head trauma, eye pain, blurred vision,  hearing deficit, oropharyngeal mucosal pain or lesions, and the patient denies throat pain or discomfort. Lymphatics: The patient denies palpable peripheral lymphadenopathy. Hematologic: The patient denies having bruising, bleeding, or progressive fatigue. Respiratory: Patient denies having shortness of breath, cough, sputum production, fever, or dyspnea on exertion. Cardiovascular: The patient denies having leg pain, leg swelling, heart palpitations, chest permit, chest pain, or lightheadedness. The patient denies having dyspnea on exertion. Gastrointestinal: The patient denies having nausea, emesis, or diarrhea.  The patient denies having any hematemesis or blood in the stool. Genitourinary: Patient denies having urinary urgency, frequency, or dysuria. The patient denies having blood in the urine. Psychological: The patient denies having symptoms of nervousness, anxiety, depression, or thoughts of harming self. Skin: Patient denies having skin rashes, skin, ulcerations, or unexplained itching or pruritus. Musculoskeletal: The patient denies having pain in the joints or bones. Objective:     Visit Vitals  BP (!) 141/92   Pulse 91   Temp 96.4 °F (35.8 °C) (Oral)   Resp 12   Ht 5' 9\" (1.753 m)   Wt 125.6 kg (277 lb)   SpO2 96%   BMI 40.91 kg/m²     ECOG PS=0  Physical Exam:   Gen. Appearance: The patient is in no acute distress. Skin: There is no bruise or rash. HEENT: The exam is unremarkable. Neck: Supple without lymphadenopathy or thyromegaly. Lungs: Clear to auscultation and percussion; there are no wheezes or rhonchi. Heart: Regular rate and rhythm; there are no murmurs, gallops, or rubs. Abdomen: Bowel sounds are present and normal.  There is no guarding, tenderness, or hepatosplenomegaly. Extremities: There is no clubbing, cyanosis, or edema. Neurologic: There are no focal neurologic deficits. Lymphatics: There is no palpable peripheral lymphadenopathy. Musculoskeletal: The patient has full range of motion at all joints. There is no evidence of joint deformity or effusions. There is no focal joint tenderness. Psychological/psychiatric: There is no clinical evidence of anxiety, depression, or melancholy.     Lab data:      Results for orders placed or performed during the hospital encounter of 06/12/19   CBC WITH 3 PART DIFF     Status: Abnormal   Result Value Ref Range Status    WBC 8.8 4.5 - 13.0 K/uL Final    RBC 3.90 (L) 4.10 - 5.10 M/uL Final    HGB 11.4 (L) 12.0 - 16.0 g/dL Final    HCT 35.2 (L) 36 - 48 % Final    MCV 90.3 78 - 102 FL Final    MCH 29.2 25.0 - 35.0 PG Final    MCHC 32.4 31 - 37 g/dL Final    RDW 13.7 11.5 - 14.5 % Final    PLATELET 669 159 - 213 K/uL Final    NEUTROPHILS 68 40 - 70 % Final    MIXED CELLS 11 0.1 - 17 % Final    LYMPHOCYTES 22 14 - 44 % Final    ABS. NEUTROPHILS 6.0 1.8 - 9.5 K/UL Final    ABS. MIXED CELLS 0.9 0.0 - 2.3 K/uL Final    ABS. LYMPHOCYTES 1.9 1.1 - 5.9 K/UL Final     Comment: Test performed at [address] [place] [address] or [facility]patient [facility] Location. Reviewed by Medical Director. DF AUTOMATED   Final           Assessment:     1. Monoclonal gammopathy of undetermined significance    2. Venous thromboembolism    3. Antiphospholipid syndrome ([facility] [address]    4. Sore throat          Plan:     Monoclonal gammopathy of undetermined significance: Patient will on active surveillance. Her M-spike on 6/12/2019 was 0.3 g/dL. SPEP and Free light chains will be obtained today. Recurrent DVT and Antiphospholipid syndrome: Patient will continue taking the Eliquis 5 mg po twice daily. CBC, CMP will obtained today. Sore throat and running nose: Patient saw her PCP yesterday because of these problems. She will follow the advice of her PCP concerning these issues. I told her to keep well hydrated, eat healthy and get enough rest and sleep. She said that her PCP scheduled her for CT of the chest next week because she has a history of pneumonia and she has been having chest pain and shortness of breath. I recommended that she provides us with the copy of the result. Follow up in 3 months or sooner if needed.     Orders Placed This Encounter    CBC WITH AUTOMATED DIFF     Standing Status:   Future     Standing Expiration Date:   76/4/6098    METABOLIC PANEL, COMPREHENSIVE     Standing Status:   Future     Standing Expiration Date:   10/2/2020    SPEP     Standing Status:   Future     Standing Expiration Date:   10/2/2020    FREE LIGHT CHAINS, KAPPA/LAMBDA, QT     Standing Status:   Future     Standing Expiration Date:   10/2/2020    lisinopril (PRINIVIL, ZESTRIL) 40 mg tablet    polyethylene glycol (MIRALAX) 17 gram/dose powder    tiZANidine (ZANAFLEX) 4 mg tablet       Farida Christine NP  10/2/2019     I have assessed the patient independently and  agree with the full assessment as outlined. Fabian Chavez MD, FACP      Please note: This document has been produced using voice recognition software. Unrecognized errors in transcription may be present.

## 2019-10-04 LAB
ALBUMIN SERPL ELPH-MCNC: 3.3 G/DL (ref 2.9–4.4)
ALBUMIN/GLOB SERPL: 1.1 {RATIO} (ref 0.7–1.7)
ALPHA1 GLOB SERPL ELPH-MCNC: 0.3 G/DL (ref 0–0.4)
ALPHA2 GLOB SERPL ELPH-MCNC: 0.8 G/DL (ref 0.4–1)
B-GLOBULIN SERPL ELPH-MCNC: 1 G/DL (ref 0.7–1.3)
GAMMA GLOB SERPL ELPH-MCNC: 0.8 G/DL (ref 0.4–1.8)
GLOBULIN SER CALC-MCNC: 2.9 G/DL (ref 2.2–3.9)
KAPPA LC FREE SER-MCNC: 21.8 MG/L (ref 3.3–19.4)
KAPPA LC FREE/LAMBDA FREE SER: 0.22 {RATIO} (ref 0.26–1.65)
LAMBDA LC FREE SERPL-MCNC: 98.6 MG/L (ref 5.7–26.3)
M PROTEIN SERPL ELPH-MCNC: 0.4 G/DL
PROT SERPL-MCNC: 6.2 G/DL (ref 6–8.5)

## 2019-11-20 DIAGNOSIS — I82.90 VENOUS THROMBOEMBOLISM: Primary | ICD-10-CM

## 2020-01-16 ENCOUNTER — HOSPITAL ENCOUNTER (OUTPATIENT)
Dept: ONCOLOGY | Age: 50
Discharge: HOME | End: 2020-01-16

## 2020-01-16 ENCOUNTER — HOSPITAL ENCOUNTER (OUTPATIENT)
Dept: LAB | Age: 50
Discharge: HOME | End: 2020-01-16
Payer: MEDICARE

## 2020-01-16 ENCOUNTER — OFFICE VISIT (OUTPATIENT)
Dept: ONCOLOGY | Age: 50
End: 2020-01-16

## 2020-01-16 VITALS
SYSTOLIC BLOOD PRESSURE: 136 MMHG | WEIGHT: 280 LBS | HEART RATE: 83 BPM | RESPIRATION RATE: 18 BRPM | DIASTOLIC BLOOD PRESSURE: 83 MMHG | OXYGEN SATURATION: 99 % | BODY MASS INDEX: 41.47 KG/M2 | HEIGHT: 69 IN

## 2020-01-16 DIAGNOSIS — D47.2 MONOCLONAL GAMMOPATHY OF UNDETERMINED SIGNIFICANCE: ICD-10-CM

## 2020-01-16 DIAGNOSIS — I82.90 VENOUS THROMBOEMBOLISM: ICD-10-CM

## 2020-01-16 DIAGNOSIS — D47.2 MONOCLONAL GAMMOPATHY OF UNDETERMINED SIGNIFICANCE: Primary | ICD-10-CM

## 2020-01-16 DIAGNOSIS — D68.61 ANTIPHOSPHOLIPID SYNDROME (HCC): ICD-10-CM

## 2020-01-16 LAB
BASO+EOS+MONOS # BLD AUTO: 0.6 K/UL (ref 0–2.3)
BASO+EOS+MONOS NFR BLD AUTO: 9 % (ref 0.1–17)
DIFFERENTIAL METHOD BLD: ABNORMAL
ERYTHROCYTE [DISTWIDTH] IN BLOOD BY AUTOMATED COUNT: 13.9 % (ref 11.5–14.5)
HCT VFR BLD AUTO: 33.5 % (ref 36–48)
HGB BLD-MCNC: 10.8 G/DL (ref 12–16)
LYMPHOCYTES # BLD: 1.6 K/UL (ref 1.1–5.9)
LYMPHOCYTES NFR BLD: 23 % (ref 14–44)
MCH RBC QN AUTO: 29.8 PG (ref 25–35)
MCHC RBC AUTO-ENTMCNC: 32.2 G/DL (ref 31–37)
MCV RBC AUTO: 92.5 FL (ref 78–102)
NEUTS SEG # BLD: 4.9 K/UL (ref 1.8–9.5)
NEUTS SEG NFR BLD: 68 % (ref 40–70)
PLATELET # BLD AUTO: 283 K/UL (ref 140–440)
RBC # BLD AUTO: 3.62 M/UL (ref 4.1–5.1)
WBC # BLD AUTO: 7.1 K/UL (ref 4.5–13)

## 2020-01-16 PROCEDURE — 83883 ASSAY NEPHELOMETRY NOT SPEC: CPT

## 2020-01-16 PROCEDURE — 80053 COMPREHEN METABOLIC PANEL: CPT

## 2020-01-16 PROCEDURE — 36415 COLL VENOUS BLD VENIPUNCTURE: CPT

## 2020-01-16 PROCEDURE — 84165 PROTEIN E-PHORESIS SERUM: CPT

## 2020-01-16 NOTE — PATIENT INSTRUCTIONS
Antiphospholipid Syndrome: Care Instructions Your Care Instructions Antiphospholipid syndrome makes the blood clot too easily. This can lead to miscarriage and other serious pregnancy problems. It can also lead to a stroke or heart attack. And it can lead to blood clots in the legs or lungs that may cause death. Antiphospholipid syndrome is caused by antibodies. Normally, the body makes antibodies that attack germs like bacteria or a virus. But with this syndrome, antibodies attack parts of your blood that affect how easily it clots. This syndrome is most often treated with blood thinners. If you are pregnant, you will need treatment. Your health will be closely watched. Follow-up care is a key part of your treatment and safety. Be sure to make and go to all appointments, and call your doctor if you are having problems. It's also a good idea to know your test results and keep a list of the medicines you take. How can you care for yourself at home? · Be safe with medicines. Take your medicines exactly as prescribed. Call your doctor if you have any problems with your medicine. You will get more details on the specific medicines your doctor prescribes. · Make sure you tell your doctor about all medicines you take. · If you are pregnant, talk to your doctor about special care that you may need. This may include medicine you get through a shot or a vein (IV) to avoid a miscarriage. · If you take a blood thinner, be sure you get instructions about how to take your medicine safely. Blood thinners can cause serious bleeding problems. When should you call for help? Call 911 anytime you think you may need emergency care. For example, call if: 
  · You have symptoms of a stroke. These may include: 
? Sudden numbness, tingling, weakness, or loss of movement in your face, arm, or leg, especially on only one side of your body. ? Sudden vision changes. ? Sudden trouble speaking. ? Sudden confusion or trouble understanding simple statements. ? Sudden problems with walking or balance. ? A sudden, severe headache that is different from past headaches.  
  · You have chest pain, are short of breath, or cough up blood.  
 Call your doctor now or seek immediate medical care if: 
  · You have signs of a blood clot in your leg (called a deep vein thrombosis), such as: 
? Pain in your calf, back of the knee, thigh, or groin. ? Redness and swelling in your leg or groin.  
 Watch closely for changes in your health, and be sure to contact your doctor if: 
  · You do not get better as expected. Where can you learn more? Go to http://andrew-yanick.info/. Enter O188 in the search box to learn more about \"Antiphospholipid Syndrome: Care Instructions. \" Current as of: March 28, 2019 Content Version: 12.2 © 5452-6349 [employer]. Care instructions adapted under license by [employer] (which disclaims liability or warranty for this information). If you have questions about a medical condition or this instruction, always ask your healthcare professional. [place] [address] any warranty or liability for your use of this information. Deep Vein Thrombosis: Care Instructions Your Care Instructions A deep vein thrombosis (DVT) is a blood clot in certain veins of the legs, pelvis, or arms. Blood clots in these veins need to be treated because they can get bigger, break loose, and travel through the bloodstream to the lungs. A blood clot in a lung can be life-threatening. The doctor may have given you a blood thinner (anticoagulant). A blood thinner can stop the blood clot from growing larger and prevent new clots from forming. You will need to take a blood thinner for 3 to 6 months or longer. The doctor has checked you carefully, but problems can develop later.  If you notice any problems or new symptoms, get medical treatment right away. Follow-up care is a key part of your treatment and safety. Be sure to make and go to all appointments, and call your doctor if you are having problems. It's also a good idea to know your test results and keep a list of the medicines you take. How can you care for yourself at home? · Take your medicines exactly as prescribed. Call your doctor if you think you are having a problem with your medicine. · If you are taking a blood thinner, be sure you get instructions about how to take your medicine safely. Blood thinners can cause serious bleeding problems. · Wear compression stockings if your doctor recommends them. These stockings are tighter at the feet than on the legs. They may reduce pain and swelling in your legs. But there are different types of stockings, and they need to fit right. So your doctor will recommend what you need. · When you sit, use a pillow to raise the arm or leg that has the blood clot. Try to keep it above the level of your heart. When should you call for help? Call 911 anytime you think you may need emergency care. For example, call if: 
  · You passed out (lost consciousness).  
  · You have symptoms of a blood clot in your lung (called a pulmonary embolism). These include: 
? Sudden chest pain. ? Trouble breathing. ? Coughing up blood.  
 Call your doctor now or seek immediate medical care if: 
  · You have new or worse trouble breathing.  
  · You are dizzy or lightheaded, or you feel like you may faint.  
  · You have symptoms of a blood clot in your arm or leg. These may include: 
? Pain in the arm, calf, back of the knee, thigh, or groin. ? Redness and swelling in the arm, leg, or groin.  
 Watch closely for changes in your health, and be sure to contact your doctor if: 
  · You do not get better as expected. Where can you learn more? Go to http://andrew-yanick.info/. Enter U775 in the search box to learn more about \"Deep Vein Thrombosis: Care Instructions. \" Current as of: September 26, 2018 Content Version: 12.2 © 2036-1588 [employer]. Care instructions adapted under license by [employer] (which disclaims liability or warranty for this information). If you have questions about a medical condition or this instruction, always ask your healthcare professional. [address] any warranty or liability for your use of this information. Learning About Monoclonal Gammopathy of Unknown Significance (MGUS) What is MGUS? Monoclonal gammopathy of unknown significance (MGUS) is a blood condition. The blood is made of many kinds of cells, including red blood cells, platelets, and white blood cells. With MGUS, a type of white blood cell called a plasma cell makes too much of the \"M\" (for \"monoclonal\") protein in the blood. Most people with MGUS are fine for many years. MGUS seldom causes symptoms or major health problems. In rare cases, MGUS may turn into multiple myeloma. This is a cancer of plasma cells in the blood that can cause bone weakness. Some people with MGUS may also have a higher risk for osteoporosis, in which bones become thin and weak. MGUS is sometimes seen in younger people, but is more common in people as they get older. It's seen most often in those over the age of 79. How is MGUS diagnosed? MGUS is often found by chance when blood tests are done for other reasons. If you have high levels of a certain protein in your blood, your doctor may order more tests. Blood tests can help identify the protein. The protein is sometimes found in the urine, so you may get a urine test too. Other tests may be done if your doctor thinks you might have a medical problem. In some cases, a bone marrow biopsy may be done to rule out a problem like multiple myeloma. How is it treated? Most people with MGUS don't need any treatment. But you may need regular physical exams, blood tests, and urine tests to make sure that MGUS isn't progressing to a medical problem. Follow-up care is a key part of your treatment and safety. Be sure to make and go to all appointments, and call your doctor if you are having problems. It's also a good idea to know your test results and keep a list of the medicines you take. Where can you learn more? Go to http://andrew-yanick.info/. Enter A917 in the search box to learn more about \"Learning About Monoclonal Gammopathy of Unknown Significance (MGUS). \" 
Current as of: March 28, 2019 Content Version: 12.2 © 9226-1384 [employer]. Care instructions adapted under license by [employer] (which disclaims liability or warranty for this information). If you have questions about a medical condition or this instruction, always ask your healthcare professional. [address] any warranty or liability for your use of this information.

## 2020-01-16 NOTE — PROGRESS NOTES
Hematology/Oncology  Progress Note    Name: Adrian Hoffmann  Date: 2020  : 1970    None     Ms. Pearl Garibay is a 44y.o. year old female who was seen for Antiphospholipid syndrome and recurrent DVT. Current therapy: Eliquis 5 mg po twice daily      Subjective:   Ms. Pearl Garibay is a 53-year-old woman who has a history of antiphospholipid syndrome and she has suffered from recurrent DVT. Currently she is on long-term anticoagulation to reduce current risk for further venous thromboembolic episodes. She is here today for follow up assessment to rule out any evidence of liver impairment since she is on lifelong anticoagulation therapy. She report that she did have cataract surgery on  left eye and 2020 for the right eye done and she report doing well. Today report occasional fever because of her underlying lupus. She has no new physical complaints to report at this time. Past medical history, family history, and social history: these were reviewed and remains unchanged.     Past Medical History:   Diagnosis Date    Anemia     Depression     Hypertension      Past Surgical History:   Procedure Laterality Date    HX HYSTERECTOMY      HX RENAL TRANSPLANT       Social History     Socioeconomic History    Marital status:      Spouse name: Not on file    Number of children: Not on file    Years of education: Not on file    Highest education level: Not on file   Occupational History    Not on file   Social Needs    Financial resource strain: Not on file    Food insecurity:     Worry: Not on file     Inability: Not on file    Transportation needs:     Medical: Not on file     Non-medical: Not on file   Tobacco Use    Smoking status: Never Smoker    Smokeless tobacco: Never Used   Substance and Sexual Activity    Alcohol use: Yes     Comment: occ    Drug use: Never    Sexual activity: Yes   Lifestyle    Physical activity:     Days per week: Not on file Minutes per session: Not on file    Stress: Not on file   Relationships    Social connections:     Talks on phone: Not on file     Gets together: Not on file     Attends [employer] service: Not on file     Active member of club or organization: Not on file     Attends meetings of clubs or organizations: Not on file     Relationship status: Not on file    Intimate partner violence:     Fear of current or ex partner: Not on file     Emotionally abused: Not on file     Physically abused: Not on file     Forced sexual activity: Not on file   Other Topics Concern    Not on file   Social History Narrative    Not on file     Family History   Problem Relation Age of Onset    Hypertension Mother     Diabetes Father     Heart Disease Father     Hypertension Father     Stroke Father      Current Outpatient Medications   Medication Sig Dispense Refill    apixaban (ELIQUIS) 5 mg tablet Take 1 Tab by mouth two (2) times a day. Indications: blood clot in a deep vein of the extremities 180 Tab 3    lisinopril (PRINIVIL, ZESTRIL) 40 mg tablet       polyethylene glycol (MIRALAX) 17 gram/dose powder       tiZANidine (ZANAFLEX) 4 mg tablet       diclofenac (VOLTAREN) 1 % gel diclofenac 1 % topical gel   APPLY ONE TO TWO GRAM TOPICALLY THREE TIMES A DAY AS DIRECTED      ibuprofen-famotidine 800-26.6 mg tab ibuprofen 800 mg tablet   Take 1 tablet 3 times a day by oral route as needed.  esomeprazole magnesium (NEXIUM PO) Take 40 mg by mouth.  predniSONE (STERAPRED) 5 mg dose pack prednisone 5 mg tablet   TAKE ONE TO TWO TABLETS BY MOUTH ONE TIME DAILY AS NEEDED FOR FLARE      carvedilol (COREG) 12.5 mg tablet two (2) times a day.       diazePAM (VALIUM) 5 mg tablet       ondansetron (ZOFRAN ODT) 4 mg disintegrating tablet       benzonatate (TESSALON) 100 mg capsule benzonatate 100 mg capsule      albuterol (PROAIR HFA) 90 mcg/actuation inhaler ProAir HFA 90 mcg/actuation aerosol inhaler      TYLENOL EXTRA STRENGTH 500 mg tablet       atenolol (TENORMIN) 25 mg tablet atenolol 25 mg tablet      cephALEXin (KEFLEX) 500 mg capsule cephalexin 500 mg capsule      cycloSPORINE (SANDIMMUNE) 100 mg capsule cyclosporine 100 mg capsule   TAKE ONE CAPSULE BY MOUTH TWICE A DAY      mycophenolate (CELLCEPT) 500 mg tablet 1,000 mg two (2) times a day.  ospemifene (OSPHENA) 60 mg tab tablet Osphena 60 mg tablet   TAKE ONE TABLET BY MOUTH ONE TIME DAILY      oxyCODONE-acetaminophen (PERCOCET 10)  mg per tablet oxycodone-acetaminophen 10 mg-325 mg tablet   TAKE ONE TABLET BY MOUTH THREE TIMES A DAY AS NEEDED FOR 28 DAYS      buprenorphine 15 mcg/hour ptwk APPLY 1 PATCH TO SKIN WEEKLY  0    buprenorphine 15 mcg/hour ptwk buprenorphine 15 mcg/hour weekly transdermal patch   APPLY ONE PATCH TO THE SKIN ONCE A WEEK      oxyCODONE IR (ROXICODONE) 5 mg immediate release tablet       NARCAN 4 mg/actuation nasal spray       pantoprazole (PROTONIX) 40 mg tablet pantoprazole 40 mg tablet,delayed release      triazolam (HALCION) 0.25 mg tablet       VITAMIN C 500 mg tablet       predniSONE (DELTASONE) 5 mg tablet          Review of Systems  Constitutional: The patient has no acute distress or discomfort. HEENT: The patient denies recent head trauma, eye pain, blurred vision,  hearing deficit, oropharyngeal mucosal pain or lesions, and the patient denies throat pain or discomfort. Lymphatics: The patient denies palpable peripheral lymphadenopathy. Hematologic: The patient denies having bruising, bleeding, or progressive fatigue. Respiratory: Patient denies having shortness of breath, cough, sputum production, fever, or dyspnea on exertion. Cardiovascular: The patient denies having leg pain, leg swelling, heart palpitations, chest permit, chest pain, or lightheadedness. The patient denies having dyspnea on exertion. Gastrointestinal: The patient denies having nausea, emesis, or diarrhea.  The patient denies having any hematemesis or blood in the stool. Genitourinary: Patient denies having urinary urgency, frequency, or dysuria. The patient denies having blood in the urine. Psychological: The patient denies having symptoms of nervousness, anxiety, depression, or thoughts of harming self. Skin: Patient denies having skin rashes, skin, ulcerations, or unexplained itching or pruritus. Musculoskeletal: The patient denies having pain in the joints or bones. Objective:     Visit Vitals  /83   Pulse 83   Resp 18   Ht 5' 9\" (1.753 m)   Wt 127 kg (280 lb)   SpO2 99%   BMI 41.35 kg/m²     ECOG PS=0  Physical Exam:   Gen. Appearance: The patient is in no acute distress. Skin: There is no bruise or rash. HEENT: The exam is unremarkable. Neck: Supple without lymphadenopathy or thyromegaly. Lungs: Clear to auscultation and percussion; there are no wheezes or rhonchi. Heart: Regular rate and rhythm; there are no murmurs, gallops, or rubs. Abdomen: Bowel sounds are present and normal.  There is no guarding, tenderness, or hepatosplenomegaly. Extremities: There is no clubbing, cyanosis, or edema. Neurologic: There are no focal neurologic deficits. Lymphatics: There is no palpable peripheral lymphadenopathy. Musculoskeletal: The patient has full range of motion at all joints. There is no evidence of joint deformity or effusions. There is no focal joint tenderness. Psychological/psychiatric: There is no clinical evidence of anxiety, depression, or melancholy.     Lab data:      Results for orders placed or performed during the hospital encounter of 01/16/20   CBC WITH 3 PART DIFF     Status: Abnormal   Result Value Ref Range Status    WBC 7.1 4.5 - 13.0 K/uL Final    RBC 3.62 (L) 4.10 - 5.10 M/uL Final    HGB 10.8 (L) 12.0 - 16.0 g/dL Final    HCT 33.5 (L) 36 - 48 % Final    MCV 92.5 78 - 102 FL Final    MCH 29.8 25.0 - 35.0 PG Final    MCHC 32.2 31 - 37 g/dL Final    RDW 13.9 11.5 - 14.5 % Final    PLATELET 975 376 - 392 K/uL Final    NEUTROPHILS 68 40 - 70 % Final    MIXED CELLS 9 0.1 - 17 % Final    LYMPHOCYTES 23 14 - 44 % Final    ABS. NEUTROPHILS 4.9 1.8 - 9.5 K/UL Final    ABS. MIXED CELLS 0.6 0.0 - 2.3 K/uL Final    ABS. LYMPHOCYTES 1.6 1.1 - 5.9 K/UL Final     Comment: Test performed at [address] [place] [address] or Outpatient Infusion Center Location. Reviewed by Medical Director. DF AUTOMATED   Final           Assessment:     1. Monoclonal gammopathy of undetermined significance    2. Venous thromboembolism    3. Antiphospholipid syndrome (HCC)          Plan:     Monoclonal gammopathy of undetermined significance: Patient will on active surveillance. Her M-spike on 10/02/2019 was 0.3 g/dL. SPEP and Free light chains will be obtained today. I have inform patient that her WBC for today shows a CBC of 7.1, hemoglobin of 10.8 g/dl and hematocrit of 33.5%. PLT of 283. She has been advise to start taking iron supplement over the counter once a day. Recurrent DVT and Antiphospholipid syndrome: Patient will continue taking the Eliquis 5 mg po twice daily. CMP will obtained today. Follow up in 3 months or sooner if needed. Orders Placed This Encounter    COMPLETE CBC & AUTO DIFF WBC    InHouse CBC (TextÃ¡do)     Standing Status:   Future     Number of Occurrences:   1     Standing Expiration Date:   1/23/2020    PROTEIN ELECTROPHORESIS     Standing Status:   Future     Standing Expiration Date:   8/41/0544    METABOLIC PANEL, COMPREHENSIVE     Standing Status:   Future     Standing Expiration Date:   1/16/2021       Elis Guillen NP  1/16/2020     I have assessed the patient independently and  agree with the full assessment as outlined. Katy Julien MD, FACP      Please note: This document has been produced using voice recognition software. Unrecognized errors in transcription may be present.

## 2020-01-17 LAB
ALBUMIN SERPL-MCNC: 3.4 G/DL (ref 3.4–5)
ALBUMIN/GLOB SERPL: 1.2 {RATIO} (ref 0.8–1.7)
ALP SERPL-CCNC: 81 U/L (ref 45–117)
ALT SERPL-CCNC: 17 U/L (ref 13–56)
ANION GAP SERPL CALC-SCNC: 9 MMOL/L (ref 3–18)
AST SERPL-CCNC: 10 U/L (ref 10–38)
BILIRUB SERPL-MCNC: 0.7 MG/DL (ref 0.2–1)
BUN SERPL-MCNC: 31 MG/DL (ref 7–18)
BUN/CREAT SERPL: 20 (ref 12–20)
CALCIUM SERPL-MCNC: 9 MG/DL (ref 8.5–10.1)
CHLORIDE SERPL-SCNC: 110 MMOL/L (ref 100–111)
CO2 SERPL-SCNC: 22 MMOL/L (ref 21–32)
CREAT SERPL-MCNC: 1.55 MG/DL (ref 0.6–1.3)
GLOBULIN SER CALC-MCNC: 2.9 G/DL (ref 2–4)
GLUCOSE SERPL-MCNC: 94 MG/DL (ref 74–99)
POTASSIUM SERPL-SCNC: 4.5 MMOL/L (ref 3.5–5.5)
PROT SERPL-MCNC: 6.3 G/DL (ref 6.4–8.2)
SODIUM SERPL-SCNC: 141 MMOL/L (ref 136–145)

## 2020-01-20 LAB
ALBUMIN SERPL ELPH-MCNC: 3.5 G/DL (ref 2.9–4.4)
ALBUMIN/GLOB SERPL: 1.4 {RATIO} (ref 0.7–1.7)
ALPHA1 GLOB SERPL ELPH-MCNC: 0.2 G/DL (ref 0–0.4)
ALPHA2 GLOB SERPL ELPH-MCNC: 0.7 G/DL (ref 0.4–1)
B-GLOBULIN SERPL ELPH-MCNC: 0.9 G/DL (ref 0.7–1.3)
GAMMA GLOB SERPL ELPH-MCNC: 0.7 G/DL (ref 0.4–1.8)
GLOBULIN SER CALC-MCNC: 2.5 G/DL (ref 2.2–3.9)
KAPPA LC FREE SER-MCNC: 19.6 MG/L (ref 3.3–19.4)
KAPPA LC FREE/LAMBDA FREE SER: 0.19 {RATIO} (ref 0.26–1.65)
LAMBDA LC FREE SERPL-MCNC: 105.7 MG/L (ref 5.7–26.3)
M PROTEIN SERPL ELPH-MCNC: 0.3 G/DL
PROT SERPL-MCNC: 6 G/DL (ref 6–8.5)

## 2020-04-09 ENCOUNTER — VIRTUAL VISIT (OUTPATIENT)
Dept: ONCOLOGY | Age: 50
End: 2020-04-09

## 2020-04-09 DIAGNOSIS — I82.90 VENOUS THROMBOEMBOLISM: ICD-10-CM

## 2020-04-09 DIAGNOSIS — D47.2 MONOCLONAL GAMMOPATHY OF UNDETERMINED SIGNIFICANCE: Primary | ICD-10-CM

## 2020-04-09 DIAGNOSIS — D68.61 ANTIPHOSPHOLIPID SYNDROME (HCC): ICD-10-CM

## 2020-04-10 ENCOUNTER — VIRTUAL VISIT (OUTPATIENT)
Dept: ONCOLOGY | Age: 50
End: 2020-04-10

## 2020-04-10 DIAGNOSIS — D47.2 MONOCLONAL GAMMOPATHY OF UNDETERMINED SIGNIFICANCE: Primary | ICD-10-CM

## 2020-04-10 DIAGNOSIS — D68.61 ANTIPHOSPHOLIPID SYNDROME (HCC): ICD-10-CM

## 2020-04-10 DIAGNOSIS — I82.90 VENOUS THROMBOEMBOLISM: ICD-10-CM

## 2020-04-10 NOTE — PROGRESS NOTES
Hematology/medical oncology progress note    4/10/2020  Navya Garsia  YOB: 1970        Consent: Amber Fam, who was seen by synchronous (real-time) audio-video technology is aware that this patient-initiated, Telehealth encounter on 4/10/2020 is a billable service, with coverage as determined by her insurance carrier. She is aware that she may receive a bill and has provided verbal consent to proceed: YES      Ms. Gwyn Avila is a 52-year-old woman who has a history of recurrent DVT and antiphospholipid syndrome. She also has chronic kidney disease. The patient is on long-term anticoagulation therapy with Eliquis. Additionally she has a history of monoclonal gammopathy of undetermined significance. Since her last clinic visit she has been doing reasonably well. Today she has no new physical complaints. She was recently hospitalized and had a complete evaluation including being tested for coronavirus which was negative. Assessment & Plan:   Diagnoses and all orders for this visit:    1. Monoclonal gammopathy of undetermined significance: I explained to the patient that her most recent monoclonal paraprotein level was normal at 0.3 ng/mL. There is no evidence of disease progression. At this time we will order the following labs. -     METABOLIC PANEL, COMPREHENSIVE; Future:  -     PROTEIN ELECTROPHORESIS; Future  -     IMMUNOGLOBULINS, G/A/M, QT.; Future    2. Venous thromboembolism: The patient has a history of recurrent DVT. She will continue with Eliquis 5 mg p.o. twice daily. 3. Antiphospholipid syndrome ([facility] [address]): The patient has a history of antiphospholipid syndrome:  The patient is continuing to do well with no evidence of activation of antiphospholipid syndrome. We will plan to see her in clinic in about 8 weeks after the restrictions relating to the coronavirus pandemic have been lifted.   She will call if she needs a prescription for any of her medications refilled. Follow-up and Dispositions    · Return in about 8 weeks (around 6/5/2020). Total time 25 minutes, greater than 50% of the time was in counseling and coordination of care. 712  Subjective:   Emilie Johnson is a 44 y.o. female who was seen for No chief complaint on file. Prior to Admission medications    Medication Sig Start Date End Date Taking? Authorizing Provider   fluticasone furoate-vilanteroL (BREO ELLIPTA) 100-25 mcg/dose inhaler Take 1 Puff by inhalation daily. Indications: controller medication for asthma    Other, MD Coleen   apixaban (ELIQUIS) 5 mg tablet Take 1 Tab by mouth two (2) times a day. Indications: blood clot in a deep vein of the extremities 11/20/19   Yue Abrams NP   lisinopril (PRINIVIL, ZESTRIL) 40 mg tablet  8/8/19   Provider, Historical   polyethylene glycol (MIRALAX) 17 gram/dose powder  9/26/19   Provider, Historical   tiZANidine (ZANAFLEX) 4 mg tablet  9/23/19   Provider, Historical   diclofenac (VOLTAREN) 1 % gel diclofenac 1 % topical gel   APPLY ONE TO TWO GRAM TOPICALLY THREE TIMES A DAY AS DIRECTED    Provider, Historical   ibuprofen-famotidine 800-26.6 mg tab ibuprofen 800 mg tablet   Take 1 tablet 3 times a day by oral route as needed. Provider, Historical   esomeprazole magnesium (NEXIUM PO) Take 40 mg by mouth. Provider, Historical   predniSONE (STERAPRED) 5 mg dose pack prednisone 5 mg tablet   TAKE ONE TO TWO TABLETS BY MOUTH ONE TIME DAILY AS NEEDED FOR FLARE    Provider, Historical   carvedilol (COREG) 12.5 mg tablet two (2) times a day.  6/11/19   Provider, Historical   diazePAM (VALIUM) 5 mg tablet  3/22/19   Provider, Historical   ondansetron (ZOFRAN ODT) 4 mg disintegrating tablet  4/24/19   Provider, Historical   benzonatate (TESSALON) 100 mg capsule benzonatate 100 mg capsule    Provider, Historical   albuterol (PROAIR HFA) 90 mcg/actuation inhaler ProAir HFA 90 mcg/actuation aerosol inhaler    Provider, Historical TYLENOL EXTRA STRENGTH 500 mg tablet  4/24/19   Provider, Historical   atenolol (TENORMIN) 25 mg tablet atenolol 25 mg tablet    Provider, Historical   cephALEXin (KEFLEX) 500 mg capsule cephalexin 500 mg capsule    Provider, Historical   cycloSPORINE (SANDIMMUNE) 100 mg capsule cyclosporine 100 mg capsule   TAKE ONE CAPSULE BY MOUTH TWICE A DAY    Provider, Historical   mycophenolate (CELLCEPT) 500 mg tablet 1,000 mg two (2) times a day. Provider, Historical   ospemifene (OSPHENA) 60 mg tab tablet Osphena 60 mg tablet   TAKE ONE TABLET BY MOUTH ONE TIME DAILY    Provider, Historical   oxyCODONE-acetaminophen (PERCOCET 10)  mg per tablet oxycodone-acetaminophen 10 mg-325 mg tablet   TAKE ONE TABLET BY MOUTH THREE TIMES A DAY AS NEEDED FOR 28 DAYS    Provider, Historical   buprenorphine 15 mcg/hour ptwk APPLY 1 PATCH TO SKIN WEEKLY 6/2/19   Provider, Historical   buprenorphine 15 mcg/hour ptwk buprenorphine 15 mcg/hour weekly transdermal patch   APPLY ONE PATCH TO THE SKIN ONCE A WEEK    Provider, Historical   oxyCODONE IR (ROXICODONE) 5 mg immediate release tablet  4/24/19   Provider, Historical   NARCAN 4 mg/actuation nasal spray  3/11/19   Provider, Historical   pantoprazole (PROTONIX) 40 mg tablet pantoprazole 40 mg tablet,delayed release    Provider, Historical   triazolam (HALCION) 0.25 mg tablet  6/10/19   Provider, Historical   VITAMIN C 500 mg tablet  4/24/19   Provider, Historical   predniSONE (DELTASONE) 5 mg tablet  6/7/19   Provider, Historical     Allergies   Allergen Reactions    Amoxicillin Itching    Celecoxib Other (comments) and Unknown (comments)    Cimetidine Other (comments)    Ciprofloxacin Itching    Sulfa (Sulfonamide Antibiotics) Itching    Vancomycin Itching           Review of Systems   Constitutional: Negative. HENT: Negative. Eyes: Negative. Respiratory: Negative. Cardiovascular: Negative. Gastrointestinal: Negative. Genitourinary: Negative. Musculoskeletal: Negative. Skin: Negative. Neurological: Negative. Endo/Heme/Allergies: Negative. Psychiatric/Behavioral: Negative. Objective:   Vital Signs: (As obtained by patient/caregiver at home)  There were no vitals taken for this visit. [INSTRUCTIONS:  \"[x]\" Indicates a positive item  \"[]\" Indicates a negative item  -- DELETE ALL ITEMS NOT EXAMINED]    Constitutional: [x] Appears well-developed and well-nourished [x] No apparent distress      [] Abnormal -     Mental status: [x] Alert and awake  [x] Oriented to person/place/time [x] Able to follow commands    [] Abnormal -     Eyes:   EOM    [x]  Normal    [] Abnormal -   Sclera  [x]  Normal    [] Abnormal -          Discharge [x]  None visible   [] Abnormal -     HENT: [x] Normocephalic, atraumatic  [] Abnormal -   [x] Mouth/Throat: Mucous membranes are moist    External Ears [x] Normal  [] Abnormal -    Neck: [x] No visualized mass [] Abnormal -     Pulmonary/Chest: [x] Respiratory effort normal   [x] No visualized signs of difficulty breathing or respiratory distress        [] Abnormal -      Musculoskeletal:   [x] Normal gait with no signs of ataxia         [x] Normal range of motion of neck        [] Abnormal -     Neurological:        [x] No Facial Asymmetry (Cranial nerve 7 motor function) (limited exam due to video visit)          [x] No gaze palsy        [] Abnormal -          Skin:        [x] No significant exanthematous lesions or discoloration noted on facial skin         [] Abnormal -            Psychiatric:       [x] Normal Affect [] Abnormal -        [x] No Hallucinations    Other pertinent observable physical exam findings:-        We discussed the expected course, resolution and complications of the diagnosis(es) in detail. Medication risks, benefits, costs, interactions, and alternatives were discussed as indicated.   I advised her to contact the office if her condition worsens, changes or fails to improve as anticipated. She expressed understanding with the diagnosis(es) and plan. Amber Fam is a 44 y.o. female being evaluated by a video visit encounter for concerns as above. Due to this being a TeleHealth encounter (During KSCAE-61 public health emergency), evaluation of the following organ systems was limited: Vitals/Constitutional/EENT/Resp/CV/GI//MS/Neuro/Skin/Heme-Lymph-Imm. Pursuant to the emergency declaration under the [address] [place] [address], [facility] waiver authority and the [employer] and [employer] Act, this Virtual  Visit was conducted, with patient's (and/or legal guardian's) consent, to reduce the patient's risk of exposure to COVID-19 and provide necessary medical care. Services were provided through a video synchronous discussion virtually to substitute for in-person clinic visit. Patient was in her home and the provider was in his [place] office. Dionte Flores MD, [address]

## 2020-08-31 PROBLEM — A41.9 SEPSIS (HCC): Status: ACTIVE | Noted: 2020-08-31

## 2020-08-31 PROBLEM — N17.9 AKI (ACUTE KIDNEY INJURY) (HCC): Status: ACTIVE | Noted: 2020-08-31

## 2020-08-31 PROBLEM — U07.1 COVID-19 VIRUS INFECTION: Status: ACTIVE | Noted: 2020-08-31

## 2020-11-30 DIAGNOSIS — I82.90 VENOUS THROMBOEMBOLISM: ICD-10-CM

## 2021-02-17 ENCOUNTER — HOSPITAL ENCOUNTER (OUTPATIENT)
Dept: LAB | Age: 51
Discharge: HOME | End: 2021-02-17
Payer: MEDICARE

## 2021-02-17 ENCOUNTER — OFFICE VISIT (OUTPATIENT)
Age: 51
End: 2021-02-17
Payer: MEDICARE

## 2021-02-17 VITALS
RESPIRATION RATE: 18 BRPM | TEMPERATURE: 97 F | WEIGHT: 258 LBS | SYSTOLIC BLOOD PRESSURE: 150 MMHG | HEART RATE: 97 BPM | BODY MASS INDEX: 38.21 KG/M2 | HEIGHT: 69 IN | DIASTOLIC BLOOD PRESSURE: 98 MMHG | OXYGEN SATURATION: 98 %

## 2021-02-17 DIAGNOSIS — I82.4Y1 ACUTE DEEP VEIN THROMBOSIS (DVT) OF PROXIMAL VEIN OF RIGHT LOWER EXTREMITY (HCC): ICD-10-CM

## 2021-02-17 DIAGNOSIS — D47.2 MGUS (MONOCLONAL GAMMOPATHY OF UNKNOWN SIGNIFICANCE): ICD-10-CM

## 2021-02-17 DIAGNOSIS — R76.0 ANTICARDIOLIPIN ANTIBODY POSITIVE: ICD-10-CM

## 2021-02-17 DIAGNOSIS — R76.0 ANTICARDIOLIPIN ANTIBODY POSITIVE: Primary | ICD-10-CM

## 2021-02-17 LAB
ALBUMIN SERPL-MCNC: 3.5 G/DL (ref 3.4–5)
ALBUMIN/GLOB SERPL: 1 {RATIO} (ref 0.8–1.7)
ALP SERPL-CCNC: 102 U/L (ref 45–117)
ALT SERPL-CCNC: 17 U/L (ref 13–56)
ANION GAP SERPL CALC-SCNC: 7 MMOL/L (ref 3–18)
AST SERPL-CCNC: 10 U/L (ref 10–38)
BASOPHILS # BLD: 0 K/UL (ref 0–0.1)
BASOPHILS NFR BLD: 0 % (ref 0–2)
BILIRUB SERPL-MCNC: 0.5 MG/DL (ref 0.2–1)
BUN SERPL-MCNC: 30 MG/DL (ref 7–18)
BUN/CREAT SERPL: 18 (ref 12–20)
CALCIUM SERPL-MCNC: 8.3 MG/DL (ref 8.5–10.1)
CHLORIDE SERPL-SCNC: 110 MMOL/L (ref 100–111)
CO2 SERPL-SCNC: 22 MMOL/L (ref 21–32)
CREAT SERPL-MCNC: 1.65 MG/DL (ref 0.6–1.3)
CRP SERPL-MCNC: 1.4 MG/DL (ref 0–0.3)
DIFFERENTIAL METHOD BLD: ABNORMAL
EOSINOPHIL # BLD: 0.1 K/UL (ref 0–0.4)
EOSINOPHIL NFR BLD: 1 % (ref 0–5)
ERYTHROCYTE [DISTWIDTH] IN BLOOD BY AUTOMATED COUNT: 17.8 % (ref 11.6–14.5)
ERYTHROCYTE [SEDIMENTATION RATE] IN BLOOD: 74 MM/HR (ref 0–30)
FERRITIN SERPL-MCNC: 11 NG/ML (ref 8–388)
FOLATE SERPL-MCNC: 4.8 NG/ML (ref 3.1–17.5)
GLOBULIN SER CALC-MCNC: 3.6 G/DL (ref 2–4)
GLUCOSE SERPL-MCNC: 108 MG/DL (ref 74–99)
HCT VFR BLD AUTO: 30.6 % (ref 35–45)
HGB BLD-MCNC: 9.5 G/DL (ref 12–16)
IRON SATN MFR SERPL: 8 % (ref 20–50)
IRON SERPL-MCNC: 36 UG/DL (ref 50–175)
LYMPHOCYTES # BLD: 0.8 K/UL (ref 0.9–3.6)
LYMPHOCYTES NFR BLD: 9 % (ref 21–52)
MCH RBC QN AUTO: 28.4 PG (ref 24–34)
MCHC RBC AUTO-ENTMCNC: 31 G/DL (ref 31–37)
MCV RBC AUTO: 91.6 FL (ref 74–97)
MONOCYTES # BLD: 0.4 K/UL (ref 0.05–1.2)
MONOCYTES NFR BLD: 5 % (ref 3–10)
NEUTS SEG # BLD: 7.8 K/UL (ref 1.8–8)
NEUTS SEG NFR BLD: 85 % (ref 40–73)
PLATELET # BLD AUTO: 384 K/UL (ref 135–420)
PMV BLD AUTO: 9.7 FL (ref 9.2–11.8)
POTASSIUM SERPL-SCNC: 4.5 MMOL/L (ref 3.5–5.5)
PROT SERPL-MCNC: 7.1 G/DL (ref 6.4–8.2)
RBC # BLD AUTO: 3.34 M/UL (ref 4.2–5.3)
SODIUM SERPL-SCNC: 139 MMOL/L (ref 136–145)
TIBC SERPL-MCNC: 429 UG/DL (ref 250–450)
VIT B12 SERPL-MCNC: 320 PG/ML (ref 211–911)
WBC # BLD AUTO: 9.1 K/UL (ref 4.6–13.2)

## 2021-02-17 PROCEDURE — 82607 VITAMIN B-12: CPT

## 2021-02-17 PROCEDURE — 85652 RBC SED RATE AUTOMATED: CPT

## 2021-02-17 PROCEDURE — G0463 HOSPITAL OUTPT CLINIC VISIT: HCPCS | Performed by: INTERNAL MEDICINE

## 2021-02-17 PROCEDURE — G8432 DEP SCR NOT DOC, RNG: HCPCS | Performed by: INTERNAL MEDICINE

## 2021-02-17 PROCEDURE — 82728 ASSAY OF FERRITIN: CPT

## 2021-02-17 PROCEDURE — 85025 COMPLETE CBC W/AUTO DIFF WBC: CPT

## 2021-02-17 PROCEDURE — G8417 CALC BMI ABV UP PARAM F/U: HCPCS | Performed by: INTERNAL MEDICINE

## 2021-02-17 PROCEDURE — 36415 COLL VENOUS BLD VENIPUNCTURE: CPT

## 2021-02-17 PROCEDURE — 83550 IRON BINDING TEST: CPT

## 2021-02-17 PROCEDURE — G8427 DOCREV CUR MEDS BY ELIG CLIN: HCPCS | Performed by: INTERNAL MEDICINE

## 2021-02-17 PROCEDURE — 3017F COLORECTAL CA SCREEN DOC REV: CPT | Performed by: INTERNAL MEDICINE

## 2021-02-17 PROCEDURE — 80053 COMPREHEN METABOLIC PANEL: CPT

## 2021-02-17 PROCEDURE — 99214 OFFICE O/P EST MOD 30 MIN: CPT | Performed by: INTERNAL MEDICINE

## 2021-02-17 PROCEDURE — 82784 ASSAY IGA/IGD/IGG/IGM EACH: CPT

## 2021-02-17 PROCEDURE — 86140 C-REACTIVE PROTEIN: CPT

## 2021-02-17 RX ORDER — OSPEMIFENE 60 MG/1
60 TABLET, FILM COATED ORAL DAILY
COMMUNITY
Start: 2020-12-25

## 2021-02-17 RX ORDER — BUPROPION HYDROCHLORIDE 150 MG/1
TABLET ORAL
COMMUNITY
Start: 2021-02-16

## 2021-02-17 RX ORDER — LISINOPRIL 10 MG/1
20 TABLET ORAL DAILY
COMMUNITY
Start: 2020-12-16

## 2021-02-17 NOTE — PROGRESS NOTES
Hematology/Oncology  Progress Note     Name: Tashi Newman  Date: 21  : 1970     None      Ms. Isaac Reed is a 44y.o. year old female who was seen for Antiphospholipid syndrome and recurrent DVT.    Current therapy: Eliquis 5 mg po twice daily        Subjective:   Ms. Isaac Reed is a 58-year-old woman who has a history of antiphospholipid syndrome and she has suffered from recurrent DVT, lupus, . Currently she is on long-term anticoagulation to reduce current risk for further venous thromboembolic episodes. She also has h/o on kidney transplant in . She is currently on prednisone, cyclosporine and Cellcept. She was asked by nephrologist to come here because recent H/H was low at 8.2/23. MCV=91  Last colonoscopy one year ago was normal she says. She is on lifelong anticoagulation therapy. She denies any repeated infection.  Denies  any fever, chills, no NV or abdominal pain. No melena or BRBPR. Has no much energy. Reports KISER. Has pagophagia. Eats popcorn every day. All other points of ROS have been reviewed and were negative. ECOG PS 1  Past medical history, family history, and social history: these were reviewed and remains unchanged.         Past Medical History:   Diagnosis Date    Anemia     Anticardiolipin antibody positive     Asthma     Depression     Hypertension      Past Surgical History:   Procedure Laterality Date    HX HYSTERECTOMY      HX RENAL TRANSPLANT       Social History     Socioeconomic History    Marital status:      Spouse name: Not on file    Number of children: Not on file    Years of education: Not on file    Highest education level: Not on file   Tobacco Use    Smoking status: Never Smoker    Smokeless tobacco: Never Used   Substance and Sexual Activity    Alcohol use: Yes     Comment: occ    Drug use: Never    Sexual activity: Yes     Family History   Problem Relation Age of Onset    Hypertension Mother     Diabetes Father     Heart Disease Father     Hypertension Father     Stroke Father        Current Outpatient Medications   Medication Sig Dispense Refill    Osphena 60 mg tab tablet 60 mg daily.  lisinopriL (PRINIVIL, ZESTRIL) 10 mg tablet 20 mg daily.  buPROPion XL (WELLBUTRIN XL) 150 mg tablet       apixaban (ELIQUIS) 5 mg tablet Take 1 Tab by mouth two (2) times a day. Indications: blood clot in a deep vein of the extremities 180 Tab 3    carvediloL (COREG) 6.25 mg tablet Take 1 Tab by mouth two (2) times a day. 60 Tab 1    ondansetron hcl (Zofran) 4 mg tablet Take 1 Tab by mouth every eight (8) hours as needed for Nausea or Vomiting. 15 Tab 0    DULoxetine (Cymbalta) 60 mg capsule Take 60 mg by mouth two (2) times a day.  ergocalciferol (Vitamin D2) 1,250 mcg (50,000 unit) capsule Take 50,000 Units by mouth every seven (7) days.  benzonatate (TESSALON) 100 mg capsule benzonatate 100 mg capsule      albuterol (PROAIR HFA) 90 mcg/actuation inhaler ProAir HFA 90 mcg/actuation aerosol inhaler      cycloSPORINE (SANDIMMUNE) 100 mg capsule 75 mg two (2) times a day.  mycophenolate (CELLCEPT) 500 mg tablet 1,000 mg two (2) times a day.  predniSONE (DELTASONE) 5 mg tablet       tiZANidine (ZANAFLEX) 4 mg tablet as needed.       predniSONE (STERAPRED) 5 mg dose pack prednisone 5 mg tablet   TAKE ONE TO TWO TABLETS BY MOUTH ONE TIME DAILY AS NEEDED FOR FLARE      pantoprazole (PROTONIX) 40 mg tablet pantoprazole 40 mg tablet,delayed release      triazolam (HALCION) 0.25 mg tablet          Allergies   Allergen Reactions    Amoxicillin Itching    Celecoxib Other (comments) and Unknown (comments)    Cimetidine Other (comments)    Ciprofloxacin Itching    Sulfa (Sulfonamide Antibiotics) Itching    Vancomycin Itching       Review of Systems  As per HPI      Objective:  Visit Vitals  BP (!) 150/98   Pulse 97   Temp 97 °F (36.1 °C) (Oral)   Resp 18   Ht 5' 9\" (1.753 m)   Wt 117 kg (258 lb)   LMP (Approximate) Comment: LMP 2003   SpO2 98%   BMI 38.10 kg/m²         Physical Exam:   General appearance - alert, well appearing, and in no distress  Mental status - alert, oriented to person, place, and time  EYE-MATT, EOMI  ENT-ENT exam normal, no neck nodes or sinus tenderness  Mouth - mucous membranes moist, pharynx normal without lesions  Neck - supple, no significant adenopathy   Chest - clear to auscultation, no wheezes, rales or rhonchi, symmetric air entry   Heart - normal rate and regular rhythm   Abdomen - soft, nontender, nondistended, no masses or organomegaly  Lymph- no adenopathy palpable  Ext-no pedal edema noted  Skin-Warm and dry. Neuro -alert, oriented, normal speech, no focal findings or movement disorder noted  Breast - no masses palapated b/l    Physical Exam     Diagnostic Imaging     No results found for this or any previous visit. Results for orders placed during the hospital encounter of 08/31/20   XR CHEST SNGL V    Narrative Indication: ams, covid. .      Impression IMPRESSION: Portable AP upright view the chest exposed at 12:05 PM August 31, 2020 reveals bilateral groundglass infiltrates diffusely in both lungs  compatible with pneumonia, most likely Covid type, new since February 18, 2020. Differential considerations include pulmonary edema. The heart is top normal in  size. Atherosclerotic changes are present in the aorta. Cardiac device overlies  the left chest.         Results for orders placed during the hospital encounter of 08/31/20   CT ABD PELV WO CONT    Narrative CT Abdomen and Pelvis without    Indication: Generalized abdominal pain. Positive COVID-19 test last week. Comparison: Abdominal ultrasound 2/18/2020. No prior CT for direct comparison. TECHNIQUE:   CT of the abdomen and pelvis WITHOUT intravenous contrast. Coronal and sagittal  reformations were obtained.         All CT exams at this facility use one or more dose reduction techniques  including automatic exposure control, mA/kV adjustment per patient's size, or  iterative reconstruction technique. DICOM format imaged data is available to  non-affiliated external healthcare facilities or entities on a secure, media  free, reciprocal searchable basis with patient authorization for 12 months  following the date of the study. DISCUSSION:    ABSENCE OF INTRAVENOUS CONTRAST DECREASES SENSITIVITY FOR DETECTION OF FOCAL  LESIONS AND VASCULAR PATHOLOGY. LOWER THORAX: Extensive bilateral groundglass airspace opacities, right greater  than left, consistent with the history of COVID-19 pneumonia. Calcified left  hilar lymph nodes. Moderate-sized hiatal hernia. Partially imaged cardiac loop  recorder in the left anterior chest.    HEPATOBILIARY: No focal hepatic lesions. Distended gallbladder. No biliary  ductal dilatation. SPLEEN: No splenomegaly. Small calcified granulomas. PANCREAS: No focal masses or ductal dilatation. ADRENALS: No adrenal nodules. KIDNEYS/URETERS: Severe bilateral native renal atrophy. Right lower quadrant  renal transplant with no hydronephrosis, calculi, or mass. PELVIC ORGANS/BLADDER: Unremarkable. PERITONEUM / RETROPERITONEUM: No free air or fluid. LYMPH NODES: No lymphadenopathy. VESSELS: Mild atheromatous changes. IVC filter. GI TRACT: No distention or wall thickening. Appendectomy. BONES AND SOFT TISSUES: Chronic appearing superior endplate compression  deformity of T11. Remaining vertebral body heights are preserved. Mild diffuse  osseous demineralization. Mild degenerative changes of the spine. No significant  soft tissue abnormality. Impression IMPRESSION:    1.  Gallbladder hydrops. No wall thickening, cholelithiasis, or evidence of  cholecystitis. 2.  Unremarkable right lower quadrant renal transplant. 3.  Sequelae of prior granulomatous disease in the chest and spleen.   4.  Extensive bilateral ground glass airspace opacities, consistent with the  history of COVID-19 pneumonia. 5.  Moderate sized hiatal hernia. Lab Results  Lab Results   Component Value Date/Time    WBC 8.6 09/07/2020 02:08 AM    HGB 8.5 (L) 09/07/2020 02:08 AM    HCT 27.8 (L) 09/07/2020 02:08 AM    PLATELET 753 69/38/2215 02:08 AM    MCV 89.4 09/07/2020 02:08 AM       Lab Results   Component Value Date/Time    Sodium 138 09/08/2020 03:32 AM    Potassium 3.9 09/08/2020 03:32 AM    Chloride 107 09/08/2020 03:32 AM    CO2 23 09/08/2020 03:32 AM    Anion gap 7 09/08/2020 03:32 AM    Glucose 114 (H) 09/08/2020 03:32 AM    BUN 28 (H) 09/08/2020 03:32 AM    Creatinine 1.2 09/08/2020 03:32 AM    BUN/Creatinine ratio 20 01/16/2020 01:48 PM    GFR est AA >60.0 09/08/2020 03:32 AM    GFR est non-AA 50 09/08/2020 03:32 AM    Calcium 8.5 09/08/2020 03:32 AM    Alk. phosphatase 70 09/04/2020 01:35 AM    Protein, total 6.2 (L) 09/04/2020 01:35 AM    Albumin 2.2 (L) 09/08/2020 03:32 AM    Globulin 2.9 01/16/2020 01:48 PM    A-G Ratio 1.2 01/16/2020 01:48 PM    ALT (SGPT) 20 09/04/2020 01:35 AM   Follow-up with PCP for health maintenance  Assessment/Plan:    ICD-10-CM ICD-9-CM    1. Anticardiolipin antibody positive  R76.0 795.79 CBC WITH AUTOMATED DIFF   2. Acute deep vein thrombosis (DVT) of proximal vein of right lower extremity (HCC)  I82.4Y1 453.41    3.  MGUS (monoclonal gammopathy of unknown significance)  D47.2 273.1 CBC WITH AUTOMATED DIFF      FERRITIN      IRON PROFILE      METABOLIC PANEL, COMPREHENSIVE      VITAMIN B12 & FOLATE      GAMMOPATHY EVAL, SPEP/CARRIE, IG QT/FLC      C REACTIVE PROTEIN, QT      SED RATE (ESR)     Orders Placed This Encounter    CBC WITH AUTOMATED DIFF     Standing Status:   Future     Standing Expiration Date:   2/18/2022    FERRITIN     Standing Status:   Future     Standing Expiration Date:   2/18/2022    IRON PROFILE     Standing Status:   Future     Standing Expiration Date:   3/34/7129    METABOLIC PANEL, COMPREHENSIVE     Standing Status:   Future Standing Expiration Date:   2022    VITAMIN B12 & FOLATE     Standing Status:   Future     Standing Expiration Date:   2022    GAMMOPATHY EVAL, SPEP/CARRIE, IG QT/FLC     Standing Status:   Future     Standing Expiration Date:   2022    C REACTIVE PROTEIN, QT     Standing Status:   Future     Standing Expiration Date:   2022    SED RATE (ESR)     Standing Status:   Future     Standing Expiration Date:   2022    Osphena 60 mg tab tablet     Si mg daily.  lisinopriL (PRINIVIL, ZESTRIL) 10 mg tablet     Si mg daily.  buPROPion XL (WELLBUTRIN XL) 150 mg tablet   Monoclonal gammopathy of undetermined significance: Patient was being followed by Dr. Arnie Molina who retired for MGUS and recurrent DVT with antiphospholipid syndrome. She is on lifetime Eliquis. Plan is to repeat the gammopathy eval as well as CBC, B12 folate and iron profile and see patient back in 2 weeks. Recurrent DVT and Antiphospholipid syndrome: No sign or symptoms of recurrence. Patient will continue taking the Eliquis 5 mg po twice daily. Immunocompromised: No repeated infections    RTC 2 weeks virtual then every 6 months to 1 year    have labs drawn prior to ROV  There are no Patient Instructions on file for this visit.        Tri Dolan MD

## 2021-02-22 PROBLEM — D50.9 IRON DEFICIENCY ANEMIA: Status: ACTIVE | Noted: 2021-02-22

## 2021-02-22 LAB
ALBUMIN SERPL ELPH-MCNC: 3.4 G/DL (ref 2.9–4.4)
ALBUMIN/GLOB SERPL: 1.1 {RATIO} (ref 0.7–1.7)
ALPHA1 GLOB SERPL ELPH-MCNC: 0.3 G/DL (ref 0–0.4)
ALPHA2 GLOB SERPL ELPH-MCNC: 0.9 G/DL (ref 0.4–1)
B-GLOBULIN SERPL ELPH-MCNC: 1 G/DL (ref 0.7–1.3)
GAMMA GLOB SERPL ELPH-MCNC: 1.2 G/DL (ref 0.4–1.8)
GLOBULIN SER-MCNC: 3.3 G/DL (ref 2.2–3.9)
IGA SERPL-MCNC: 100 MG/DL (ref 87–352)
IGG SERPL-MCNC: 1257 MG/DL (ref 586–1602)
IGM SERPL-MCNC: 130 MG/DL (ref 26–217)
INTERPRETATION SERPL IEP-IMP: ABNORMAL
KAPPA LC FREE SER-MCNC: 24 MG/L (ref 3.3–19.4)
KAPPA LC FREE/LAMBDA FREE SER: 0.06 {RATIO} (ref 0.26–1.65)
LAMBDA LC FREE SERPL-MCNC: 424.8 MG/L (ref 5.7–26.3)
M PROTEIN SERPL ELPH-MCNC: 0.7 G/DL
PROT SERPL-MCNC: 6.7 G/DL (ref 6–8.5)

## 2021-02-22 RX ORDER — ACETAMINOPHEN 325 MG/1
650 TABLET ORAL AS NEEDED
Status: CANCELLED
Start: 2021-02-23

## 2021-02-22 RX ORDER — HYDROCORTISONE SODIUM SUCCINATE 100 MG/2ML
100 INJECTION, POWDER, FOR SOLUTION INTRAMUSCULAR; INTRAVENOUS AS NEEDED
Status: CANCELLED | OUTPATIENT
Start: 2021-02-23

## 2021-02-22 RX ORDER — DIPHENHYDRAMINE HYDROCHLORIDE 50 MG/ML
50 INJECTION, SOLUTION INTRAMUSCULAR; INTRAVENOUS AS NEEDED
Status: CANCELLED
Start: 2021-02-23

## 2021-02-22 RX ORDER — DIPHENHYDRAMINE HYDROCHLORIDE 50 MG/ML
25 INJECTION, SOLUTION INTRAMUSCULAR; INTRAVENOUS AS NEEDED
Status: CANCELLED
Start: 2021-02-23

## 2021-02-22 RX ORDER — EPINEPHRINE 1 MG/ML
0.3 INJECTION, SOLUTION, CONCENTRATE INTRAVENOUS AS NEEDED
Status: CANCELLED | OUTPATIENT
Start: 2021-02-23

## 2021-02-22 RX ORDER — ALBUTEROL SULFATE 0.83 MG/ML
2.5 SOLUTION RESPIRATORY (INHALATION) AS NEEDED
Status: CANCELLED
Start: 2021-02-23

## 2021-02-22 RX ORDER — SODIUM CHLORIDE 0.9 % (FLUSH) 0.9 %
10 SYRINGE (ML) INJECTION AS NEEDED
Status: CANCELLED | OUTPATIENT
Start: 2021-02-23

## 2021-02-22 RX ORDER — ONDANSETRON 2 MG/ML
8 INJECTION INTRAMUSCULAR; INTRAVENOUS AS NEEDED
Status: CANCELLED | OUTPATIENT
Start: 2021-02-23

## 2021-02-22 RX ORDER — HEPARIN 100 UNIT/ML
300-500 SYRINGE INTRAVENOUS AS NEEDED
Status: CANCELLED
Start: 2021-02-23

## 2021-02-23 ENCOUNTER — HOSPITAL ENCOUNTER (OUTPATIENT)
Dept: INFUSION THERAPY | Age: 51
Discharge: HOME | End: 2021-02-23
Payer: MEDICARE

## 2021-02-23 VITALS
HEART RATE: 78 BPM | SYSTOLIC BLOOD PRESSURE: 133 MMHG | WEIGHT: 258.1 LBS | DIASTOLIC BLOOD PRESSURE: 85 MMHG | BODY MASS INDEX: 38.11 KG/M2 | OXYGEN SATURATION: 98 % | TEMPERATURE: 97 F | RESPIRATION RATE: 18 BRPM

## 2021-02-23 DIAGNOSIS — D50.9 IRON DEFICIENCY ANEMIA, UNSPECIFIED IRON DEFICIENCY ANEMIA TYPE: Primary | ICD-10-CM

## 2021-02-23 DIAGNOSIS — D50.9 IRON DEFICIENCY ANEMIA, UNSPECIFIED IRON DEFICIENCY ANEMIA TYPE: ICD-10-CM

## 2021-02-23 PROCEDURE — 74011250636 HC RX REV CODE- 250/636: Performed by: INTERNAL MEDICINE

## 2021-02-23 PROCEDURE — 96365 THER/PROPH/DIAG IV INF INIT: CPT

## 2021-02-23 RX ORDER — ONDANSETRON 2 MG/ML
8 INJECTION INTRAMUSCULAR; INTRAVENOUS AS NEEDED
Status: CANCELLED | OUTPATIENT
Start: 2021-03-02

## 2021-02-23 RX ORDER — EPINEPHRINE 1 MG/ML
0.3 INJECTION, SOLUTION, CONCENTRATE INTRAVENOUS AS NEEDED
Status: CANCELLED | OUTPATIENT
Start: 2021-03-02

## 2021-02-23 RX ORDER — DIPHENHYDRAMINE HYDROCHLORIDE 50 MG/ML
50 INJECTION, SOLUTION INTRAMUSCULAR; INTRAVENOUS AS NEEDED
Status: CANCELLED
Start: 2021-03-02

## 2021-02-23 RX ORDER — SODIUM CHLORIDE 9 MG/ML
10 INJECTION INTRAMUSCULAR; INTRAVENOUS; SUBCUTANEOUS AS NEEDED
Status: CANCELLED | OUTPATIENT
Start: 2021-03-02

## 2021-02-23 RX ORDER — SODIUM CHLORIDE 9 MG/ML
10 INJECTION INTRAMUSCULAR; INTRAVENOUS; SUBCUTANEOUS AS NEEDED
Status: ACTIVE | OUTPATIENT
Start: 2021-02-23 | End: 2021-02-23

## 2021-02-23 RX ORDER — ACETAMINOPHEN 325 MG/1
650 TABLET ORAL AS NEEDED
Status: CANCELLED
Start: 2021-03-02

## 2021-02-23 RX ORDER — SODIUM CHLORIDE 0.9 % (FLUSH) 0.9 %
10 SYRINGE (ML) INJECTION AS NEEDED
Status: CANCELLED | OUTPATIENT
Start: 2021-03-02

## 2021-02-23 RX ORDER — SODIUM CHLORIDE 9 MG/ML
25 INJECTION, SOLUTION INTRAVENOUS CONTINUOUS
Status: CANCELLED | OUTPATIENT
Start: 2021-03-02

## 2021-02-23 RX ORDER — DIPHENHYDRAMINE HYDROCHLORIDE 50 MG/ML
25 INJECTION, SOLUTION INTRAMUSCULAR; INTRAVENOUS AS NEEDED
Status: CANCELLED
Start: 2021-03-02

## 2021-02-23 RX ORDER — SODIUM CHLORIDE 9 MG/ML
25 INJECTION, SOLUTION INTRAVENOUS CONTINUOUS
Status: DISPENSED | OUTPATIENT
Start: 2021-02-23 | End: 2021-02-23

## 2021-02-23 RX ORDER — ALBUTEROL SULFATE 0.83 MG/ML
2.5 SOLUTION RESPIRATORY (INHALATION) AS NEEDED
Status: CANCELLED
Start: 2021-03-02

## 2021-02-23 RX ORDER — HYDROCORTISONE SODIUM SUCCINATE 100 MG/2ML
100 INJECTION, POWDER, FOR SOLUTION INTRAMUSCULAR; INTRAVENOUS AS NEEDED
Status: CANCELLED | OUTPATIENT
Start: 2021-03-02

## 2021-02-23 RX ORDER — HEPARIN 100 UNIT/ML
300-500 SYRINGE INTRAVENOUS AS NEEDED
Status: CANCELLED
Start: 2021-03-02

## 2021-02-23 RX ADMIN — FERRIC CARBOXYMALTOSE INJECTION 750 MG: 50 INJECTION, SOLUTION INTRAVENOUS at 11:55

## 2021-02-23 RX ADMIN — SODIUM CHLORIDE 25 ML/HR: 0.9 INJECTION, SOLUTION INTRAVENOUS at 11:55

## 2021-02-23 RX ADMIN — SODIUM CHLORIDE 10 ML: 9 INJECTION INTRAMUSCULAR; INTRAVENOUS; SUBCUTANEOUS at 12:35

## 2021-02-24 NOTE — PROGRESS NOTES
Patient received her first dose of Injectafer yesterday and her 2nd dose is scheduled on 03/02. Will see patient in [facility].

## 2021-03-02 ENCOUNTER — OFFICE VISIT (OUTPATIENT)
Age: 51
End: 2021-03-02
Payer: MEDICARE

## 2021-03-02 ENCOUNTER — HOSPITAL ENCOUNTER (OUTPATIENT)
Dept: INFUSION THERAPY | Age: 51
Discharge: HOME | End: 2021-03-02
Payer: MEDICARE

## 2021-03-02 VITALS
WEIGHT: 257 LBS | HEIGHT: 69 IN | BODY MASS INDEX: 38.06 KG/M2 | RESPIRATION RATE: 16 BRPM | TEMPERATURE: 97.2 F | HEART RATE: 65 BPM | DIASTOLIC BLOOD PRESSURE: 90 MMHG | SYSTOLIC BLOOD PRESSURE: 141 MMHG | OXYGEN SATURATION: 100 %

## 2021-03-02 VITALS
RESPIRATION RATE: 20 BRPM | OXYGEN SATURATION: 97 % | HEART RATE: 85 BPM | DIASTOLIC BLOOD PRESSURE: 87 MMHG | TEMPERATURE: 97.8 F | SYSTOLIC BLOOD PRESSURE: 145 MMHG

## 2021-03-02 DIAGNOSIS — R76.0 ANTICARDIOLIPIN ANTIBODY POSITIVE: ICD-10-CM

## 2021-03-02 DIAGNOSIS — D50.9 IRON DEFICIENCY ANEMIA, UNSPECIFIED IRON DEFICIENCY ANEMIA TYPE: Primary | ICD-10-CM

## 2021-03-02 DIAGNOSIS — I82.4Y1 ACUTE DEEP VEIN THROMBOSIS (DVT) OF PROXIMAL VEIN OF RIGHT LOWER EXTREMITY (HCC): ICD-10-CM

## 2021-03-02 DIAGNOSIS — D50.9 IRON DEFICIENCY ANEMIA, UNSPECIFIED IRON DEFICIENCY ANEMIA TYPE: ICD-10-CM

## 2021-03-02 DIAGNOSIS — D47.2 MGUS (MONOCLONAL GAMMOPATHY OF UNKNOWN SIGNIFICANCE): ICD-10-CM

## 2021-03-02 DIAGNOSIS — D47.2 MGUS (MONOCLONAL GAMMOPATHY OF UNKNOWN SIGNIFICANCE): Primary | ICD-10-CM

## 2021-03-02 PROCEDURE — G0463 HOSPITAL OUTPT CLINIC VISIT: HCPCS | Performed by: INTERNAL MEDICINE

## 2021-03-02 PROCEDURE — 99214 OFFICE O/P EST MOD 30 MIN: CPT | Performed by: INTERNAL MEDICINE

## 2021-03-02 PROCEDURE — 74011250636 HC RX REV CODE- 250/636: Performed by: INTERNAL MEDICINE

## 2021-03-02 PROCEDURE — 3017F COLORECTAL CA SCREEN DOC REV: CPT | Performed by: INTERNAL MEDICINE

## 2021-03-02 PROCEDURE — G8427 DOCREV CUR MEDS BY ELIG CLIN: HCPCS | Performed by: INTERNAL MEDICINE

## 2021-03-02 PROCEDURE — G8417 CALC BMI ABV UP PARAM F/U: HCPCS | Performed by: INTERNAL MEDICINE

## 2021-03-02 PROCEDURE — G8432 DEP SCR NOT DOC, RNG: HCPCS | Performed by: INTERNAL MEDICINE

## 2021-03-02 PROCEDURE — 96365 THER/PROPH/DIAG IV INF INIT: CPT

## 2021-03-02 RX ORDER — DIPHENHYDRAMINE HYDROCHLORIDE 50 MG/ML
25 INJECTION, SOLUTION INTRAMUSCULAR; INTRAVENOUS AS NEEDED
Status: CANCELLED
Start: 2021-03-02

## 2021-03-02 RX ORDER — SODIUM CHLORIDE 0.9 % (FLUSH) 0.9 %
10 SYRINGE (ML) INJECTION AS NEEDED
Status: CANCELLED | OUTPATIENT
Start: 2021-03-02

## 2021-03-02 RX ORDER — ACETAMINOPHEN 325 MG/1
650 TABLET ORAL AS NEEDED
Status: CANCELLED
Start: 2021-03-02

## 2021-03-02 RX ORDER — ONDANSETRON 2 MG/ML
8 INJECTION INTRAMUSCULAR; INTRAVENOUS AS NEEDED
Status: CANCELLED | OUTPATIENT
Start: 2021-03-02

## 2021-03-02 RX ORDER — HYDROCORTISONE SODIUM SUCCINATE 100 MG/2ML
100 INJECTION, POWDER, FOR SOLUTION INTRAMUSCULAR; INTRAVENOUS AS NEEDED
Status: CANCELLED | OUTPATIENT
Start: 2021-03-02

## 2021-03-02 RX ORDER — SODIUM CHLORIDE 9 MG/ML
25 INJECTION, SOLUTION INTRAVENOUS CONTINUOUS
Status: CANCELLED | OUTPATIENT
Start: 2021-03-02

## 2021-03-02 RX ORDER — EPINEPHRINE 1 MG/ML
0.3 INJECTION, SOLUTION, CONCENTRATE INTRAVENOUS AS NEEDED
Status: CANCELLED | OUTPATIENT
Start: 2021-03-02

## 2021-03-02 RX ORDER — DIPHENHYDRAMINE HYDROCHLORIDE 50 MG/ML
50 INJECTION, SOLUTION INTRAMUSCULAR; INTRAVENOUS AS NEEDED
Status: CANCELLED
Start: 2021-03-02

## 2021-03-02 RX ORDER — ALBUTEROL SULFATE 0.83 MG/ML
2.5 SOLUTION RESPIRATORY (INHALATION) AS NEEDED
Status: CANCELLED
Start: 2021-03-02

## 2021-03-02 RX ORDER — HEPARIN 100 UNIT/ML
300-500 SYRINGE INTRAVENOUS AS NEEDED
Status: CANCELLED
Start: 2021-03-02

## 2021-03-02 RX ORDER — SODIUM CHLORIDE 9 MG/ML
10 INJECTION INTRAMUSCULAR; INTRAVENOUS; SUBCUTANEOUS AS NEEDED
Status: CANCELLED | OUTPATIENT
Start: 2021-03-02

## 2021-03-02 RX ORDER — SODIUM CHLORIDE 0.9 % (FLUSH) 0.9 %
10 SYRINGE (ML) INJECTION AS NEEDED
Status: DISCONTINUED | OUTPATIENT
Start: 2021-03-02 | End: 2021-03-03 | Stop reason: HOSPADM

## 2021-03-02 RX ORDER — SODIUM CHLORIDE 9 MG/ML
25 INJECTION, SOLUTION INTRAVENOUS CONTINUOUS
Status: DISCONTINUED | OUTPATIENT
Start: 2021-03-02 | End: 2021-03-03 | Stop reason: HOSPADM

## 2021-03-02 RX ADMIN — Medication 10 ML: at 14:16

## 2021-03-02 RX ADMIN — FERRIC CARBOXYMALTOSE INJECTION 750 MG: 50 INJECTION, SOLUTION INTRAVENOUS at 13:48

## 2021-03-02 RX ADMIN — SODIUM CHLORIDE 25 ML/HR: 9 INJECTION, SOLUTION INTRAVENOUS at 13:48

## 2021-03-02 NOTE — PROGRESS NOTES
[address] [facility] Progress Note    Date: 2021    Name: Hayden Batista    MRN: [id number]         : 1970    Injectafer Infusion 2 of 2    Ms. Garsia to [facility], [facility], at 1310. Pt was assessed and education was provided. Ms. Garsia's vitals were reviewed and patient was observed for 5 minutes prior to treatment. Visit Vitals  /86 (BP 1 Location: Right lower arm, BP Patient Position: Sitting)   Pulse (!) 103   Temp 97.1 °F (36.2 °C)   Resp 20   SpO2 98%   Breastfeeding No         24 g PIV placed in Right hand x two attempts by HERNAN Coleman RN. PIV flushed easily and had brisk blood return. Injectafer 750 mg/250 ml NS was infused over 20 minutes per order. VS stable and pt denied complaints of itching, lip/tongue/facial swelling, SOB, CP or other complaints. Ms. Benny Sharma tolerated the infusion, and had no complaints. VS remained stable. PIV flushed with NS 10 ml and removed catheter intact. No bleeding or hematoma noted at site. Gauze and coban applied. Patient armband removed and shredded. Ms. Garsia was discharged from [place] [address] in stable condition at 1420. She is to follow up with PCP as needed.     Miranda Jones RN  2021  3:15 PM

## 2021-03-02 NOTE — PROGRESS NOTES
Hematology/Oncology  Progress Note     Name: Louis Childs  Date: 3/02/21  : 1970     None      Ms. Awilda Fry is a 44y.o. year old female who was seen for Antiphospholipid syndrome and recurrent DVT.    Current therapy: Eliquis 5 mg po twice daily        Subjective:   Ms. Awilda Fry is a 66-year-old woman who has a history of antiphospholipid syndrome and she has suffered from recurrent DVT, lupus, . Currently she is on long-term anticoagulation to reduce current risk for further venous thromboembolic episodes. She also has h/o on kidney transplant in . She is currently on prednisone, cyclosporine and Cellcept. She was asked by nephrologist to come here because recent H/H was low at 8.2/23. MCV=91  Last colonoscopy one year ago was normal she says. She is on lifelong anticoagulation therapy. She denies any repeated infection.  Denies  any fever, chills, no NV or abdominal pain. No melena or BRBPR. Has no much energy. Reports KISER. Has pagophagia. Eats popcorn every day. All other points of ROS have been reviewed and were negative. ECOG PS 1  Past medical history, family history, and social history: these were reviewed and remains unchanged.         Past Medical History:   Diagnosis Date    Anemia     Anticardiolipin antibody positive     Asthma     Depression     Hypertension      Past Surgical History:   Procedure Laterality Date    HX HYSTERECTOMY      HX RENAL TRANSPLANT       Social History     Socioeconomic History    Marital status:      Spouse name: Not on file    Number of children: Not on file    Years of education: Not on file    Highest education level: Not on file   Tobacco Use    Smoking status: Never Smoker    Smokeless tobacco: Never Used   Substance and Sexual Activity    Alcohol use: Yes     Comment: occ    Drug use: Never    Sexual activity: Yes     Family History   Problem Relation Age of Onset    Hypertension Mother     Diabetes Father     Heart Disease Father     Hypertension Father     Stroke Father        Current Outpatient Medications   Medication Sig Dispense Refill    Osphena 60 mg tab tablet 60 mg daily.  lisinopriL (PRINIVIL, ZESTRIL) 10 mg tablet 20 mg daily.  buPROPion XL (WELLBUTRIN XL) 150 mg tablet       apixaban (ELIQUIS) 5 mg tablet Take 1 Tab by mouth two (2) times a day. Indications: blood clot in a deep vein of the extremities 180 Tab 3    carvediloL (COREG) 6.25 mg tablet Take 1 Tab by mouth two (2) times a day. 60 Tab 1    ondansetron hcl (Zofran) 4 mg tablet Take 1 Tab by mouth every eight (8) hours as needed for Nausea or Vomiting. 15 Tab 0    DULoxetine (Cymbalta) 60 mg capsule Take 60 mg by mouth two (2) times a day.  ergocalciferol (Vitamin D2) 1,250 mcg (50,000 unit) capsule Take 50,000 Units by mouth every seven (7) days.  tiZANidine (ZANAFLEX) 4 mg tablet as needed.  predniSONE (STERAPRED) 5 mg dose pack prednisone 5 mg tablet   TAKE ONE TO TWO TABLETS BY MOUTH ONE TIME DAILY AS NEEDED FOR FLARE      benzonatate (TESSALON) 100 mg capsule benzonatate 100 mg capsule      albuterol (PROAIR HFA) 90 mcg/actuation inhaler ProAir HFA 90 mcg/actuation aerosol inhaler      cycloSPORINE (SANDIMMUNE) 100 mg capsule 75 mg two (2) times a day.  mycophenolate (CELLCEPT) 500 mg tablet 1,000 mg two (2) times a day.       pantoprazole (PROTONIX) 40 mg tablet pantoprazole 40 mg tablet,delayed release      predniSONE (DELTASONE) 5 mg tablet        Facility-Administered Medications Ordered in Other Visits   Medication Dose Route Frequency Provider Last Rate Last Admin    0.9% sodium chloride infusion  25 mL/hr IntraVENous CONTINUOUS Raheel Otero MD   Stopped at 03/02/21 1415    sodium chloride (NS) flush 10 mL  10 mL IntraVENous PRN Raheel Otero MD   10 mL at 03/02/21 1416       Allergies   Allergen Reactions    Amoxicillin Itching    Celecoxib Other (comments) and Unknown (comments)    Cimetidine Other (comments)    Ciprofloxacin Itching    Sulfa (Sulfonamide Antibiotics) Itching    Vancomycin Itching       Review of Systems  As per HPI      Objective:  Visit Vitals  BP (!) 141/90 (BP 1 Location: Right lower arm)   Pulse 65   Temp 97.2 °F (36.2 °C) (Oral)   Resp 16   Ht 5' 9\" (1.753 m)   Wt 116.6 kg (257 lb)   SpO2 100%   BMI 37.95 kg/m²         Physical Exam:   General appearance - alert, well appearing, and in no distress  Mental status - alert, oriented to person, place, and time  EYE-MATT, EOMI  ENT-ENT exam normal, no neck nodes or sinus tenderness  Mouth - mucous membranes moist, pharynx normal without lesions  Neck - supple, no significant adenopathy   Chest - clear to auscultation, no wheezes, rales or rhonchi, symmetric air entry   Heart - normal rate and regular rhythm   Abdomen - soft, nontender, nondistended, no masses or organomegaly  Lymph- no adenopathy palpable  Ext-no pedal edema noted  Skin-Warm and dry. Neuro -alert, oriented, normal speech, no focal findings or movement disorder noted          Diagnostic Imaging     No results found for this or any previous visit. Results for orders placed during the hospital encounter of 08/31/20   XR CHEST SNGL V    Narrative Indication: ams, covid. .      Impression IMPRESSION: Portable AP upright view the chest exposed at 12:05 PM August 31, 2020 reveals bilateral groundglass infiltrates diffusely in both lungs  compatible with pneumonia, most likely Covid type, new since February 18, 2020. Differential considerations include pulmonary edema. The heart is top normal in  size. Atherosclerotic changes are present in the aorta. Cardiac device overlies  the left chest.         Results for orders placed during the hospital encounter of 08/31/20   CT ABD PELV WO CONT    Narrative CT Abdomen and Pelvis without    Indication: Generalized abdominal pain. Positive COVID-19 test last week. Comparison: Abdominal ultrasound 2/18/2020. No prior CT for direct comparison. TECHNIQUE:   CT of the abdomen and pelvis WITHOUT intravenous contrast. Coronal and sagittal  reformations were obtained. All CT exams at this facility use one or more dose reduction techniques  including automatic exposure control, mA/kV adjustment per patient's size, or  iterative reconstruction technique. DICOM format imaged data is available to  non-affiliated external healthcare facilities or entities on a secure, media  free, reciprocal searchable basis with patient authorization for 12 months  following the date of the study. DISCUSSION:    ABSENCE OF INTRAVENOUS CONTRAST DECREASES SENSITIVITY FOR DETECTION OF FOCAL  LESIONS AND VASCULAR PATHOLOGY. LOWER THORAX: Extensive bilateral groundglass airspace opacities, right greater  than left, consistent with the history of COVID-19 pneumonia. Calcified left  hilar lymph nodes. Moderate-sized hiatal hernia. Partially imaged cardiac loop  recorder in the left anterior chest.    HEPATOBILIARY: No focal hepatic lesions. Distended gallbladder. No biliary  ductal dilatation. SPLEEN: No splenomegaly. Small calcified granulomas. PANCREAS: No focal masses or ductal dilatation. ADRENALS: No adrenal nodules. KIDNEYS/URETERS: Severe bilateral native renal atrophy. Right lower quadrant  renal transplant with no hydronephrosis, calculi, or mass. PELVIC ORGANS/BLADDER: Unremarkable. PERITONEUM / RETROPERITONEUM: No free air or fluid. LYMPH NODES: No lymphadenopathy. VESSELS: Mild atheromatous changes. IVC filter. GI TRACT: No distention or wall thickening. Appendectomy. BONES AND SOFT TISSUES: Chronic appearing superior endplate compression  deformity of T11. Remaining vertebral body heights are preserved. Mild diffuse  osseous demineralization. Mild degenerative changes of the spine. No significant  soft tissue abnormality. Impression IMPRESSION:    1.  Gallbladder hydrops.  No wall thickening, cholelithiasis, or evidence of  cholecystitis. 2.  Unremarkable right lower quadrant renal transplant. 3.  Sequelae of prior granulomatous disease in the chest and spleen. 4.  Extensive bilateral ground glass airspace opacities, consistent with the  history of COVID-19 pneumonia. 5.  Moderate sized hiatal hernia. Lab Results  Lab Results   Component Value Date/Time    WBC 9.1 02/17/2021 02:48 PM    HGB 9.5 (L) 02/17/2021 02:48 PM    HCT 30.6 (L) 02/17/2021 02:48 PM    PLATELET 960 54/47/7950 02:48 PM    MCV 91.6 02/17/2021 02:48 PM       Lab Results   Component Value Date/Time    Sodium 139 02/17/2021 02:48 PM    Potassium 4.5 02/17/2021 02:48 PM    Chloride 110 02/17/2021 02:48 PM    CO2 22 02/17/2021 02:48 PM    Anion gap 7 02/17/2021 02:48 PM    Glucose 108 (H) 02/17/2021 02:48 PM    BUN 30 (H) 02/17/2021 02:48 PM    Creatinine 1.65 (H) 02/17/2021 02:48 PM    BUN/Creatinine ratio 18 02/17/2021 02:48 PM    GFR est AA 40 (L) 02/17/2021 02:48 PM    GFR est non-AA 33 (L) 02/17/2021 02:48 PM    Calcium 8.3 (L) 02/17/2021 02:48 PM    Alk. phosphatase 102 02/17/2021 02:48 PM    Protein, total 7.1 02/17/2021 02:48 PM    Protein, total 6.7 02/17/2021 02:48 PM    Albumin 3.5 02/17/2021 02:48 PM    Globulin 3.6 02/17/2021 02:48 PM    A-G Ratio 1.0 02/17/2021 02:48 PM    ALT (SGPT) 17 02/17/2021 02:48 PM   Follow-up with PCP for health maintenance  Assessment/Plan:    ICD-10-CM ICD-9-CM    1. MGUS (monoclonal gammopathy of unknown significance)  D47.2 273.1 PROTEIN ELECTROPHORESIS + CARRIE, UR, 24HR      IR BX BONE MARROW DIAGNOSTIC   2. Iron deficiency anemia, unspecified iron deficiency anemia type  D50.9 280.9 CELIAC PANEL   3. Anticardiolipin antibody positive  R76.0 795.79    4. Acute deep vein thrombosis (DVT) of proximal vein of right lower extremity (HCC)  I82.4Y1 453.41      Orders Placed This Encounter    IR BX BONE MARROW DIAGNOSTIC     Patient with IgG MGUS and kidney disease as well as her anemia. Ruling out/in multiple myeloma or AL amyloidosis. Standing Status:   Future     Standing Expiration Date:   4/2/2022     Order Specific Question:   Transport     Answer:   Ambulatory [1]    PROTEIN ELECTROPHORESIS + CARRIE, UR, 24HR     Standing Status:   Future     Standing Expiration Date:   3/3/2022    CELIAC PANEL     Standing Status:   Future     Standing Expiration Date:   3/3/2022   Monoclonal gammopathy of undetermined significance: Patient was being followed by Dr. Radha Bowers who retired for MGUS and recurrent DVT with antiphospholipid syndrome. She is on lifetime Eliquis. Labs on 2/17/2021 showed WBC 9.1, H&H 9.5/30.6, platelets 594. IgG 1257  IgA 100  IgM 130  Total protein 6.7, albumin 3.4, M spike 0.7, CARRIE showed IgG lambda restricted monoclonal protein. K/L ratio decreased at 0.06. CRP is elevated at 1.4, ESR elevated at 74. *Check 24 hours urine to rule out/in light chain disease or amyloidosis. *Consider fat pad biopsy if increased protein and Bence-Gutierrez protein in urine  *Due to anemia which is certainly also secondary to iron deficiency and chronic kidney disease I will order a bone marrow biopsy to make sure patient does not have multiple myeloma or amyloidosis. Marrow to be stained for Congo red. Recurrent DVT and Antiphospholipid syndrome: No sign or symptoms of recurrence. Patient will continue taking the Eliquis 5 mg po twice daily. Immunocompromised: No repeated infections    Iron deficiency anemia:  She completed Injectafer 2/2 doses today 3/2/2021. Colonoscopy was done about one year ago in May 2020 and was normal.       RTC 4 weeks. have labs drawn prior to ROV  There are no Patient Instructions on file for this visit. Follow-up and Dispositions    · Return in about 4 weeks (around 3/30/2021).          Nick Osuna MD

## 2021-03-15 ENCOUNTER — TRANSCRIBE ORDER (OUTPATIENT)
Dept: INTERVENTIONAL RADIOLOGY/VASCULAR | Age: 51
End: 2021-03-15

## 2021-03-15 DIAGNOSIS — D47.2 MGUS (MONOCLONAL GAMMOPATHY OF UNKNOWN SIGNIFICANCE): Primary | ICD-10-CM

## 2021-03-19 ENCOUNTER — HOSPITAL ENCOUNTER (OUTPATIENT)
Dept: CT IMAGING | Age: 51
Discharge: HOME | End: 2021-03-19
Attending: RADIOLOGY | Admitting: RADIOLOGY
Payer: MEDICARE

## 2021-03-19 VITALS
SYSTOLIC BLOOD PRESSURE: 146 MMHG | DIASTOLIC BLOOD PRESSURE: 74 MMHG | BODY MASS INDEX: 37.55 KG/M2 | HEIGHT: 69 IN | HEART RATE: 79 BPM | RESPIRATION RATE: 18 BRPM | TEMPERATURE: 98.3 F | OXYGEN SATURATION: 100 % | WEIGHT: 253.5 LBS

## 2021-03-19 DIAGNOSIS — D47.2 MGUS (MONOCLONAL GAMMOPATHY OF UNKNOWN SIGNIFICANCE): ICD-10-CM

## 2021-03-19 LAB
ANION GAP SERPL CALC-SCNC: 10 MMOL/L (ref 3–18)
APTT PPP: 23.5 SEC (ref 23–36.4)
BASOPHILS # BLD: 0 K/UL (ref 0–0.1)
BASOPHILS NFR BLD: 0 % (ref 0–2)
BUN SERPL-MCNC: 25 MG/DL (ref 7–18)
BUN/CREAT SERPL: 14 (ref 12–20)
CALCIUM SERPL-MCNC: 9 MG/DL (ref 8.5–10.1)
CHLORIDE SERPL-SCNC: 111 MMOL/L (ref 100–111)
CO2 SERPL-SCNC: 22 MMOL/L (ref 21–32)
CREAT SERPL-MCNC: 1.78 MG/DL (ref 0.6–1.3)
DIFFERENTIAL METHOD BLD: ABNORMAL
EOSINOPHIL # BLD: 0.1 K/UL (ref 0–0.4)
EOSINOPHIL NFR BLD: 1 % (ref 0–5)
ERYTHROCYTE [DISTWIDTH] IN BLOOD BY AUTOMATED COUNT: 21.3 % (ref 11.6–14.5)
GLUCOSE SERPL-MCNC: 97 MG/DL (ref 74–99)
HCT VFR BLD AUTO: 34.7 % (ref 35–45)
HGB BLD-MCNC: 10.9 G/DL (ref 12–16)
INR PPP: 1 (ref 0.8–1.2)
LYMPHOCYTES # BLD: 0.8 K/UL (ref 0.9–3.6)
LYMPHOCYTES NFR BLD: 8 % (ref 21–52)
MCH RBC QN AUTO: 29.8 PG (ref 24–34)
MCHC RBC AUTO-ENTMCNC: 31.4 G/DL (ref 31–37)
MCV RBC AUTO: 94.8 FL (ref 74–97)
MONOCYTES # BLD: 0.6 K/UL (ref 0.05–1.2)
MONOCYTES NFR BLD: 6 % (ref 3–10)
NEUTS SEG # BLD: 8.2 K/UL (ref 1.8–8)
NEUTS SEG NFR BLD: 85 % (ref 40–73)
PLATELET # BLD AUTO: 300 K/UL (ref 135–420)
PMV BLD AUTO: 10.2 FL (ref 9.2–11.8)
POTASSIUM SERPL-SCNC: 4.2 MMOL/L (ref 3.5–5.5)
PROTHROMBIN TIME: 13.5 SEC (ref 11.5–15.2)
RBC # BLD AUTO: 3.66 M/UL (ref 4.2–5.3)
SODIUM SERPL-SCNC: 143 MMOL/L (ref 136–145)
WBC # BLD AUTO: 9.7 K/UL (ref 4.6–13.2)

## 2021-03-19 PROCEDURE — 88311 DECALCIFY TISSUE: CPT

## 2021-03-19 PROCEDURE — 88313 SPECIAL STAINS GROUP 2: CPT

## 2021-03-19 PROCEDURE — 88333 PATH CONSLTJ SURG CYTO XM 1: CPT

## 2021-03-19 PROCEDURE — 74011250636 HC RX REV CODE- 250/636: Performed by: RADIOLOGY

## 2021-03-19 PROCEDURE — 85730 THROMBOPLASTIN TIME PARTIAL: CPT

## 2021-03-19 PROCEDURE — 85610 PROTHROMBIN TIME: CPT

## 2021-03-19 PROCEDURE — 74011000250 HC RX REV CODE- 250: Performed by: RADIOLOGY

## 2021-03-19 PROCEDURE — 85025 COMPLETE CBC W/AUTO DIFF WBC: CPT

## 2021-03-19 PROCEDURE — 88360 TUMOR IMMUNOHISTOCHEM/MANUAL: CPT

## 2021-03-19 PROCEDURE — 80048 BASIC METABOLIC PNL TOTAL CA: CPT

## 2021-03-19 PROCEDURE — 88264 CHROMOSOME ANALYSIS 20-25: CPT

## 2021-03-19 PROCEDURE — 88237 TISSUE CULTURE BONE MARROW: CPT

## 2021-03-19 PROCEDURE — 88305 TISSUE EXAM BY PATHOLOGIST: CPT

## 2021-03-19 PROCEDURE — 88185 FLOWCYTOMETRY/TC ADD-ON: CPT

## 2021-03-19 PROCEDURE — 88342 IMHCHEM/IMCYTCHM 1ST ANTB: CPT

## 2021-03-19 PROCEDURE — 88184 FLOWCYTOMETRY/ TC 1 MARKER: CPT

## 2021-03-19 PROCEDURE — 77030028872 CT BX BONE MARROW DIAGNOSTIC

## 2021-03-19 RX ORDER — HYDROCODONE BITARTRATE AND ACETAMINOPHEN 5; 325 MG/1; MG/1
1 TABLET ORAL
Status: DISCONTINUED | OUTPATIENT
Start: 2021-03-19 | End: 2021-03-19 | Stop reason: HOSPADM

## 2021-03-19 RX ORDER — LIDOCAINE HYDROCHLORIDE 10 MG/ML
1-20 INJECTION INFILTRATION; PERINEURAL
Status: DISCONTINUED | OUTPATIENT
Start: 2021-03-19 | End: 2021-03-19 | Stop reason: HOSPADM

## 2021-03-19 RX ORDER — HEPARIN SODIUM 1000 [USP'U]/ML
5000 INJECTION, SOLUTION INTRAVENOUS; SUBCUTANEOUS
Status: COMPLETED | OUTPATIENT
Start: 2021-03-19 | End: 2021-03-19

## 2021-03-19 RX ORDER — SODIUM CHLORIDE 9 MG/ML
20 INJECTION, SOLUTION INTRAVENOUS CONTINUOUS
Status: DISCONTINUED | OUTPATIENT
Start: 2021-03-19 | End: 2021-03-19 | Stop reason: HOSPADM

## 2021-03-19 RX ORDER — MIDAZOLAM HYDROCHLORIDE 1 MG/ML
.5-4 INJECTION, SOLUTION INTRAMUSCULAR; INTRAVENOUS
Status: DISCONTINUED | OUTPATIENT
Start: 2021-03-19 | End: 2021-03-19 | Stop reason: HOSPADM

## 2021-03-19 RX ORDER — FENTANYL CITRATE 50 UG/ML
25-200 INJECTION, SOLUTION INTRAMUSCULAR; INTRAVENOUS
Status: DISCONTINUED | OUTPATIENT
Start: 2021-03-19 | End: 2021-03-19 | Stop reason: HOSPADM

## 2021-03-19 RX ADMIN — FENTANYL CITRATE 25 MCG: 50 INJECTION, SOLUTION INTRAMUSCULAR; INTRAVENOUS at 13:32

## 2021-03-19 RX ADMIN — LIDOCAINE HYDROCHLORIDE 10 ML: 10 INJECTION, SOLUTION INFILTRATION; PERINEURAL at 13:24

## 2021-03-19 RX ADMIN — FENTANYL CITRATE 25 MCG: 50 INJECTION, SOLUTION INTRAMUSCULAR; INTRAVENOUS at 13:24

## 2021-03-19 RX ADMIN — HEPARIN SODIUM 5000 UNITS: 1000 INJECTION INTRAVENOUS; SUBCUTANEOUS at 13:31

## 2021-03-19 RX ADMIN — FENTANYL CITRATE 50 MCG: 50 INJECTION, SOLUTION INTRAMUSCULAR; INTRAVENOUS at 13:15

## 2021-03-19 RX ADMIN — FENTANYL CITRATE 50 MCG: 50 INJECTION, SOLUTION INTRAMUSCULAR; INTRAVENOUS at 13:19

## 2021-03-19 RX ADMIN — MIDAZOLAM 1 MG: 1 INJECTION INTRAMUSCULAR; INTRAVENOUS at 13:19

## 2021-03-19 RX ADMIN — MIDAZOLAM 0.5 MG: 1 INJECTION INTRAMUSCULAR; INTRAVENOUS at 13:24

## 2021-03-19 RX ADMIN — MIDAZOLAM 1 MG: 1 INJECTION INTRAMUSCULAR; INTRAVENOUS at 13:15

## 2021-03-19 RX ADMIN — SODIUM CHLORIDE 20 ML/HR: 900 INJECTION, SOLUTION INTRAVENOUS at 11:16

## 2021-03-19 NOTE — PERIOP NOTES
Pre-Op Summary    Pt arrived via car with family/friend and is oriented to time, place, person and situation. Patient with unsteady gait with wheelchair assistive devices. Visit Vitals  Pulse 98   Resp 22   Ht 5' 9\" (1.753 m)   Wt 115 kg (253 lb 8 oz)   SpO2 100%   BMI 37.44 kg/m²       Patients belongings are located with patient. Patient's point of contact is  Tracy Becerra  and their contact number is: [phone]. They will be leaving and coming back. They are able to receive medication information. They will be their ride home.

## 2021-03-19 NOTE — PROCEDURES
The patient is an appropriate candidate to undergo CT guided bone marrow aspiration and biopsy. Patient assessed immediately prior to induction. Anesthesia plan as follows: Moderate Sedation. Planned agent(s):  fentanyl and versed    ASA Score:  ASA 2 - Mild systemic disease    History and Physical update:  H&P was reviewed and the patient was examined. No changes have occurred in the patient's condition since the H&P was completed.     Giovana Capellan MD  Vascular & Interventional Radiology  [facility]  3/19/2021

## 2021-03-19 NOTE — PERIOP NOTES
Phase 2 Recovery Summary    Report received from [address] [place] [address] Avenue:    03/19/21 1328 03/19/21 1333 03/19/21 1338 03/19/21 1350   BP: 125/63 135/73 131/74 (!) 146/74   Pulse: 88 86 84 79   Resp: 13 14 16 18   Temp:    98.3 °F (36.8 °C)   SpO2: 93% 94% 96% 100%   Weight:       Height:           oriented to time, place, person and situation          Lines and Drains  Peripheral Intravenous Line: Removed prior to discharge   Peripheral IV 03/19/21 Left;Posterior Hand (Active)       Wound        Patient assisted to chair with minimal assist. Pateint tolerating liquids well. Discharge discussed with patient and family. No questions or concerns at this time. Patient had time to ask any questions. Dressing to left iliac crest clean, dry and intact. Discharge medications reviewed with patient and appropriate educational materials and side effects teaching were provided. Patient discharged to home with her  without incident.        Na Rebollar RN

## 2021-03-19 NOTE — DISCHARGE INSTRUCTIONS
Patient Education     DISCHARGE SUMMARY from Nurse    PATIENT INSTRUCTIONS:    After general anesthesia or intravenous sedation, for 24 hours or while taking prescription Narcotics:  · Limit your activities  · Do not drive and operate hazardous machinery  · Do not make important personal or business decisions  · Do  not drink alcoholic beverages  · If you have not urinated within 8 hours after discharge, please contact your surgeon on call. Report the following to your surgeon:  · Excessive pain, swelling, redness or odor of or around the surgical area  · Temperature over 100.5  · Nausea and vomiting lasting longer than 4 hours or if unable to take medications  · Any signs of decreased circulation or nerve impairment to extremity: change in color, persistent  numbness, tingling, coldness or increase pain  · Any questions    What to do at Home:  Recommended activity: Activity as tolerated and no driving for today,     These are general instructions for a healthy lifestyle:    No smoking/ No tobacco products/ Avoid exposure to second hand smoke  Surgeon General's Warning:  Quitting smoking now greatly reduces serious risk to your health. Obesity, smoking, and sedentary lifestyle greatly increases your risk for illness    A healthy diet, regular physical exercise & weight monitoring are important for maintaining a healthy lifestyle    You may be retaining fluid if you have a history of heart failure or if you experience any of the following symptoms:  Weight gain of 3 pounds or more overnight or 5 pounds in a week, increased swelling in our hands or feet or shortness of breath while lying flat in bed. Please call your doctor as soon as you notice any of these symptoms; do not wait until your next office visit. The discharge information has been reviewed with the patient. The patient verbalized understanding.   Discharge medications reviewed with the patient and appropriate educational materials and side effects teaching were provided. Patient {ARMBANDS:[id number]}  ___________________________________________________________________________________________________________________________________     Bone Marrow Aspiration and Biopsy: What to Expect at Home  Your Recovery  The biopsy site may feel sore for several days. It can help to walk, take pain medicine, and put ice packs on the site. You will probably be able to return to work and your usual activities the day after the procedure. Your doctor or nurse will call you with the results of your test.  This care sheet gives you a general idea about how long it will take for you to recover. But each person recovers at a different pace. Follow the steps below to get better as quickly as possible. How can you care for yourself at home? Activity    · Rest when you feel tired. Getting enough sleep will help you recover.     · You may drive when you are no longer taking pain pills and can quickly move your foot from the gas pedal to the brake. You must also be able to sit comfortably for a long period of time, even if you do not plan to go far. You might get caught in traffic.     · Most people are able to return to work the day after the procedure. Medicines    · Your doctor will tell you if and when you can restart your medicines. He or she will also give you instructions about taking any new medicines.     · If you take aspirin or some other blood thinner, ask your doctor if and when to start taking it again. Make sure that you understand exactly what your doctor wants you to do.     · Be safe with medicines. Take pain medicines exactly as directed. ? If the doctor gave you a prescription medicine for pain, take it as prescribed. ? If you are not taking a prescription pain medicine, take an over-the-counter medicine such as acetaminophen (Tylenol), ibuprofen (Advil, Motrin), or naproxen (Aleve). Read and follow all instructions on the label.   ? Do not take two or more pain medicines at the same time unless the doctor told you to. Many pain medicines have acetaminophen, which is Tylenol. Too much acetaminophen (Tylenol) can be harmful.     · If you think your pain medicine is making you sick to your stomach:  ? Take your medicine after meals (unless your doctor has told you not to). ? Ask your doctor for a different pain medicine.     · If your doctor prescribed antibiotics, take them as directed. Do not stop taking them just because you feel better. Ice    · Put ice or a cold pack on the biopsy site for 10 to 20 minutes at a time. Put a thin cloth between the ice and your skin. Follow-up care is a key part of your treatment and safety. Be sure to make and go to all appointments, and call your doctor if you are having problems. It's also a good idea to know your test results and keep a list of the medicines you take. When should you call for help? Call 911 anytime you think you may need emergency care. For example, call if:    · You passed out (lost consciousness). Call your doctor now or seek immediate medical care if:    · You have signs of infection, such as:  ? Increased pain, swelling, warmth, or redness. ? Red streaks leading from the biopsy site. ? Pus draining from the biopsy site. ? Swollen lymph nodes in your neck, armpits, or groin. ? A fever. Watch closely for any changes in your health, and be sure to contact your doctor if:    · You are not getting better as expected. Where can you learn more? Go to http://www.gray.com/  Enter E148 in the search box to learn more about \"Bone Marrow Aspiration and Biopsy: What to Expect at Home. \"  Current as of: December 9, 2019               Content Version: 12.6  © 4006-4820 [employer]. Care instructions adapted under license by [employer] (which disclaims liability or warranty for this information).  If you have questions about a medical condition or this instruction, always ask your healthcare professional. [place] [address] any warranty or liability for your use of this information.

## 2021-03-19 NOTE — PROGRESS NOTES
TRANSFER - OUT REPORT:    Verbal report given to [facility] URIEL RN(name) on Ayden Goddard  being transferred to [facility] Phase 2 recovery(unit) for routine progression of care       Report consisted of patients Situation, Background, Assessment and   Recommendations(SBAR). Information from the following report(s) SBAR, Procedure Summary, MAR and Cardiac Rhythm SR, and post procedure plan of care was reviewed with the receiving nurse. Lines:   Peripheral IV 03/19/21 Left;Posterior Hand (Active)        Opportunity for questions and clarification was provided.       Patient transported with:   [employer]

## 2021-03-19 NOTE — PROCEDURES
Vascular & Interventional Radiology Brief Procedure Note    Interventional Radiologist: Sergey Tejeda    Pre-operative Diagnosis:  MGUS    Post-operative Diagnosis: Same as pre-op dx    Procedure(s) Performed:  CT guided bone marrow asp and bx    Anesthesia:  Local and Moderate Sedation    Complications: None    Estimated Blood Loss:  minimal    Tubes and Drains: None    Specimens: Bone marrow biopsy and core    Condition: Good    Disposition:  Outpatient recovery then d/c home    Plan: f/u with oncology

## 2021-03-30 ENCOUNTER — OFFICE VISIT (OUTPATIENT)
Age: 51
End: 2021-03-30
Payer: MEDICARE

## 2021-03-30 VITALS
TEMPERATURE: 98.2 F | HEART RATE: 91 BPM | BODY MASS INDEX: 37.62 KG/M2 | DIASTOLIC BLOOD PRESSURE: 100 MMHG | HEIGHT: 69 IN | RESPIRATION RATE: 17 BRPM | WEIGHT: 254 LBS | SYSTOLIC BLOOD PRESSURE: 161 MMHG | OXYGEN SATURATION: 97 %

## 2021-03-30 DIAGNOSIS — I82.4Y1 ACUTE DEEP VEIN THROMBOSIS (DVT) OF PROXIMAL VEIN OF RIGHT LOWER EXTREMITY (HCC): ICD-10-CM

## 2021-03-30 DIAGNOSIS — D47.2 MGUS (MONOCLONAL GAMMOPATHY OF UNKNOWN SIGNIFICANCE): Primary | ICD-10-CM

## 2021-03-30 DIAGNOSIS — D50.9 IRON DEFICIENCY ANEMIA, UNSPECIFIED IRON DEFICIENCY ANEMIA TYPE: ICD-10-CM

## 2021-03-30 DIAGNOSIS — R76.0 ANTICARDIOLIPIN ANTIBODY POSITIVE: ICD-10-CM

## 2021-03-30 PROCEDURE — G8417 CALC BMI ABV UP PARAM F/U: HCPCS | Performed by: INTERNAL MEDICINE

## 2021-03-30 PROCEDURE — G0463 HOSPITAL OUTPT CLINIC VISIT: HCPCS | Performed by: INTERNAL MEDICINE

## 2021-03-30 PROCEDURE — G8427 DOCREV CUR MEDS BY ELIG CLIN: HCPCS | Performed by: INTERNAL MEDICINE

## 2021-03-30 PROCEDURE — 3017F COLORECTAL CA SCREEN DOC REV: CPT | Performed by: INTERNAL MEDICINE

## 2021-03-30 PROCEDURE — 99214 OFFICE O/P EST MOD 30 MIN: CPT | Performed by: INTERNAL MEDICINE

## 2021-03-30 PROCEDURE — G8432 DEP SCR NOT DOC, RNG: HCPCS | Performed by: INTERNAL MEDICINE

## 2021-03-30 NOTE — PROGRESS NOTES
Hematology/Oncology  Progress Note     Name: Luanna Hamman  Date: 3/30/21  : 1970     None      Ms. Juyd Elder is a 44y.o. year old female who was seen for Antiphospholipid syndrome and recurrent DVT.    Current therapy: Eliquis 5 mg po twice daily        Subjective:   Ms. Judy Elder is a 58-year-old woman who has a history of antiphospholipid syndrome and she has suffered from recurrent DVT, lupus, . Currently she is on long-term anticoagulation to reduce current risk for further venous thromboembolic episodes. She also has h/o on kidney transplant in . She is currently on prednisone, cyclosporine and Cellcept. She was asked by nephrologist to come here because recent H/H was low at 8.2/23. MCV=91  Last colonoscopy one year ago was normal she says. She is on lifelong anticoagulation therapy. She denies any repeated infection.  Denies  any fever, chills, no NV or abdominal pain. No melena or BRBPR. Has no much energy. Dyspnea and exertion and pagophagia have resolved. She does not eat popcorn every day anymore. All other points of ROS have been reviewed and were negative. ECOG PS 1  Past medical history, family history, and social history: these were reviewed and remains unchanged.         Past Medical History:   Diagnosis Date    Anemia     Anticardiolipin antibody positive     Asthma     Depression     Fibromyalgia     Hypertension     Lupus ([facility] [address]      Past Surgical History:   Procedure Laterality Date    HX HYSTERECTOMY      HX RENAL TRANSPLANT       Social History     Socioeconomic History    Marital status:      Spouse name: Not on file    Number of children: Not on file    Years of education: Not on file    Highest education level: Not on file   Tobacco Use    Smoking status: Never Smoker    Smokeless tobacco: Never Used   Substance and Sexual Activity    Alcohol use: Yes     Comment: occ    Drug use: Never    Sexual activity: Yes     Family History Problem Relation Age of Onset    Hypertension Mother     Diabetes Father     Heart Disease Father     Hypertension Father     Stroke Father        Current Outpatient Medications   Medication Sig Dispense Refill    tapentadoL (Nucynta) 50 mg tablet Take 50 mg by mouth three (3) times daily.  fluconazole (DIFLUCAN PO) Take  by mouth. Fungal infection on bilateral feet/toes      Osphena 60 mg tab tablet 60 mg daily.  lisinopriL (PRINIVIL, ZESTRIL) 10 mg tablet 20 mg daily.  buPROPion XL (WELLBUTRIN XL) 150 mg tablet       apixaban (ELIQUIS) 5 mg tablet Take 1 Tab by mouth two (2) times a day. Indications: blood clot in a deep vein of the extremities 180 Tab 3    carvediloL (COREG) 6.25 mg tablet Take 1 Tab by mouth two (2) times a day. 60 Tab 1    ondansetron hcl (Zofran) 4 mg tablet Take 1 Tab by mouth every eight (8) hours as needed for Nausea or Vomiting. 15 Tab 0    DULoxetine (Cymbalta) 60 mg capsule Take 60 mg by mouth two (2) times a day.  ergocalciferol (Vitamin D2) 1,250 mcg (50,000 unit) capsule Take 50,000 Units by mouth every seven (7) days.  tiZANidine (ZANAFLEX) 4 mg tablet as needed.  predniSONE (STERAPRED) 5 mg dose pack prednisone 5 mg tablet   TAKE ONE TO TWO TABLETS BY MOUTH ONE TIME DAILY AS NEEDED FOR FLARE      albuterol (PROAIR HFA) 90 mcg/actuation inhaler ProAir HFA 90 mcg/actuation aerosol inhaler      cycloSPORINE (SANDIMMUNE) 100 mg capsule 75 mg two (2) times a day.  mycophenolate (CELLCEPT) 500 mg tablet 1,000 mg two (2) times a day.       pantoprazole (PROTONIX) 40 mg tablet pantoprazole 40 mg tablet,delayed release      predniSONE (DELTASONE) 5 mg tablet          Allergies   Allergen Reactions    Amoxicillin Itching    Celecoxib Other (comments) and Unknown (comments)    Cimetidine Other (comments)    Ciprofloxacin Itching    Sulfa (Sulfonamide Antibiotics) Itching    Vancomycin Itching       Review of Systems  As per HPI      Objective:  Visit Vitals  BP (!) 161/100 (BP 1 Location: Right arm)   Pulse 91   Temp 98.2 °F (36.8 °C) (Oral)   Resp 17   Ht 5' 9\" (1.753 m)   Wt 115.2 kg (254 lb)   SpO2 97%   BMI 37.51 kg/m²         Physical Exam:   General appearance - alert, well appearing, and in no distress  Mental status - alert, oriented to person, place, and time  EYE-MATT, EOMI  ENT-ENT exam normal, no neck nodes or sinus tenderness  Mouth - mucous membranes moist, pharynx normal without lesions  Neck - supple, no significant adenopathy   Chest - clear to auscultation, no wheezes, rales or rhonchi, symmetric air entry   Heart - normal rate and regular rhythm   Abdomen - soft, nontender, nondistended, no masses or organomegaly  Lymph- no adenopathy palpable  Ext-no pedal edema noted  Skin-Warm and dry. Neuro -alert, oriented, normal speech, no focal findings or movement disorder noted          Diagnostic Imaging     No results found for this or any previous visit. Results for orders placed during the hospital encounter of 08/31/20   XR CHEST SNGL V    Narrative Indication: ams, covid. .      Impression IMPRESSION: Portable AP upright view the chest exposed at 12:05 PM August 31, 2020 reveals bilateral groundglass infiltrates diffusely in both lungs  compatible with pneumonia, most likely Covid type, new since February 18, 2020. Differential considerations include pulmonary edema. The heart is top normal in  size. Atherosclerotic changes are present in the aorta. Cardiac device overlies  the left chest.         Results for orders placed during the hospital encounter of 03/19/21   CT BX BONE MARROW DIAGNOSTIC    Narrative PROCEDURE: CT-GUIDED LEFT ILIAC BONE MARROW ASPIRATION AND BIOPSY    INDICATION: MGUS  Percutaneous bone marrow biopsy requested. TECHNIQUE AND FINDINGS: Informed consent was obtained from the patient prior to  procedure. Standard pre-procedure timeout at 1315 hrs.   The patient was placed  in the prone position and localizing non contrast CT sections through the  central aspect of the left iliac bone were performed. One or more dose reduction techniques were used on this CT: automated exposure  control, adjustment of the mAs and/or kVp according to patient size, and  iterative reconstruction techniques. The specific techniques used on this CT  exam have been documented in the patient's electronic medical record. GUIDANCE: CT guidance was used to position (and confirm the position of) the  needle. Image(s) saved in PACS: CT. The procedure was performed using standard aseptic technique. 1% buffered  lidocaine was used to anesthetize the skin down to the periosteum of the  posterior aspect of the iliac bone. The On Control 11G core biopsy kit was used. A single pass was made and the  needle tip confirmed in good position using CT. A small amount of marrow  aspirate 2 cc was obtained in a 20 cc syringe and handed to the present  technologist for standard smears. After this, and after clearing the needle, 8  cc of marrow was aspirated into a heparin coated syringe and given to the  present technologist for special studies. An additional 8 cc of aspirate was  placed in a nonheparinized syringe and handed to the hematology technologist and  placed in 2 separate CBC tubes. A bone marrow core biopsy was then obtained  deep to the marrow that was aspirated. This core is of good caliber. Standard  post procedure pause. The patient tolerated the procedure very well, without complications. CONSCIOUS SEDATION: Provided by radiology nursing with versed 2.5 mg IV and  fentanyl 150 ugm IV with continuous monitoring of vital signs. Start time 1315  / End time 1333.    ---------------    Impression SUCCESSFUL LEFT ILIAC BONE MARROW ASPIRATE AND BIOPSY.                  Lab Results  Lab Results   Component Value Date/Time    WBC 9.7 03/19/2021 11:00 AM    HGB 10.9 (L) 03/19/2021 11:00 AM    HCT 34.7 (L) 03/19/2021 11:00 AM    PLATELET 725 56/94/3353 11:00 AM    MCV 94.8 03/19/2021 11:00 AM       Lab Results   Component Value Date/Time    Sodium 143 03/19/2021 11:00 AM    Potassium 4.2 03/19/2021 11:00 AM    Chloride 111 03/19/2021 11:00 AM    CO2 22 03/19/2021 11:00 AM    Anion gap 10 03/19/2021 11:00 AM    Glucose 97 03/19/2021 11:00 AM    BUN 25 (H) 03/19/2021 11:00 AM    Creatinine 1.78 (H) 03/19/2021 11:00 AM    BUN/Creatinine ratio 14 03/19/2021 11:00 AM    GFR est AA 36 (L) 03/19/2021 11:00 AM    GFR est non-AA 30 (L) 03/19/2021 11:00 AM    Calcium 9.0 03/19/2021 11:00 AM    Alk. phosphatase 102 02/17/2021 02:48 PM    Protein, total 7.1 02/17/2021 02:48 PM    Protein, total 6.7 02/17/2021 02:48 PM    Albumin 3.5 02/17/2021 02:48 PM    Globulin 3.6 02/17/2021 02:48 PM    A-G Ratio 1.0 02/17/2021 02:48 PM    ALT (SGPT) 17 02/17/2021 02:48 PM   Follow-up with PCP for health maintenance  Assessment/Plan:    ICD-10-CM ICD-9-CM    1. MGUS (monoclonal gammopathy of unknown significance)  D47.2 273.1 CBC WITH AUTOMATED DIFF      FERRITIN      IRON PROFILE      METABOLIC PANEL, COMPREHENSIVE      VITAMIN B12 & FOLATE      SED RATE (ESR)      GAMMOPATHY EVAL, SPEP/CARRIE, IG QT/FLC   2. Iron deficiency anemia, unspecified iron deficiency anemia type  D50.9 280.9    3. Acute deep vein thrombosis (DVT) of proximal vein of right lower extremity (HCC)  I82.4Y1 453.41    4.  Anticardiolipin antibody positive  R76.0 795.79      Orders Placed This Encounter    CBC WITH AUTOMATED DIFF     Standing Status:   Future     Standing Expiration Date:   3/31/2022    FERRITIN     Standing Status:   Future     Standing Expiration Date:   3/31/2022    IRON PROFILE     Standing Status:   Future     Standing Expiration Date:   0/61/0854    METABOLIC PANEL, COMPREHENSIVE     Standing Status:   Future     Standing Expiration Date:   3/31/2022    VITAMIN B12 & FOLATE     Standing Status:   Future     Standing Expiration Date:   3/31/2022  SED RATE (ESR)     Standing Status:   Future     Standing Expiration Date:   3/31/2022    GAMMOPATHY EVAL, SPEP/CARRIE, IG QT/FLC     Standing Status:   Future     Standing Expiration Date:   3/31/2022   Monoclonal gammopathy of undetermined significance: Patient was being followed by Dr. Bhargav Rivero who retired for MGUS and recurrent DVT with antiphospholipid syndrome. She is on lifetime Eliquis. Labs on 2/17/2021 showed WBC 9.1, H&H 9.5/30.6, platelets 011. IgG 1257  IgA 100  IgM 130  Total protein 6.7, albumin 3.4, M spike 0.7, CARRIE showed IgG lambda restricted monoclonal protein. K/L ratio decreased at 0.06. CRP is elevated at 1.4, ESR elevated at 74. *Bone marrow biopsy done on 3/19/2021 showed    PERIPHERAL BLOOD SMEAR, BONE MARROW ASPIRATE AND BIOPSY:  PLASMA CELL NEOPLASM WITH LESS THAN 5% BONE MARROW PLASMA CELLS. NORMOCELLULAR MARROW WITH PROGRESSIVE, TRILINEAGE HEMATOPOIESIS. MILD NORMOCYTIC ANEMIA, NEUTROPHILIA AND LYMPHOPENIA. ADEQUATE STORAGE IRON. CONGO RED STAIN NEGATIVE FOR AMYLOID. Labs on 3/19/2021 showed WBC 9.7, there is already response to iron infusion and hemoglobin went from 9.5 to 10.9 after iron infusion. ANC 8.2, ALC 0.8. ADDENDUM 9/24/2021: Labs done on 9/22/2021 showed BUN 20, creatinine 1.6, ferritin 520, transferrin saturation 53%, vitamin B12 546, normal folate. WBC 5.7, H&H 12.6/39.3, . Normal differential.  ESR elevated at 66.  23.8 mg/L (3.319.4), lambda free light chain 261 mg/L (5.726. 3) K/L0.09. Recurrent DVT and Antiphospholipid syndrome: No sign or symptoms of recurrence. Patient will continue taking the Eliquis 5 mg po twice daily. Immunocompromised: No repeated infections    Iron deficiency anemia:  She completed Injectafer 2/2 doses today 3/2/2021. In a matter of 2 weeks hemoglobin has responded to iron infusion and has increased from 9.5-10.9.   Continue follow-up  Colonoscopy was done about one year ago in May 2020 and was normal. RTC 6 months with repeat labs    have labs drawn prior to [address]  There are no Patient Instructions on file for this visit.        Shorty Forrest MD

## 2021-09-29 ENCOUNTER — OFFICE VISIT (OUTPATIENT)
Age: 51
End: 2021-09-29
Payer: MEDICARE

## 2021-09-29 VITALS
HEIGHT: 69 IN | DIASTOLIC BLOOD PRESSURE: 95 MMHG | BODY MASS INDEX: 37.18 KG/M2 | TEMPERATURE: 97.8 F | WEIGHT: 251 LBS | HEART RATE: 84 BPM | SYSTOLIC BLOOD PRESSURE: 135 MMHG | RESPIRATION RATE: 18 BRPM | OXYGEN SATURATION: 96 %

## 2021-09-29 DIAGNOSIS — D47.2 MGUS (MONOCLONAL GAMMOPATHY OF UNKNOWN SIGNIFICANCE): Primary | ICD-10-CM

## 2021-09-29 DIAGNOSIS — D47.2 MGUS (MONOCLONAL GAMMOPATHY OF UNKNOWN SIGNIFICANCE): ICD-10-CM

## 2021-09-29 DIAGNOSIS — R76.8 ELEVATED SERUM IMMUNOGLOBULIN FREE LIGHT CHAIN LEVEL: ICD-10-CM

## 2021-09-29 PROCEDURE — G0463 HOSPITAL OUTPT CLINIC VISIT: HCPCS | Performed by: INTERNAL MEDICINE

## 2021-09-29 PROCEDURE — 3017F COLORECTAL CA SCREEN DOC REV: CPT | Performed by: INTERNAL MEDICINE

## 2021-09-29 PROCEDURE — G8427 DOCREV CUR MEDS BY ELIG CLIN: HCPCS | Performed by: INTERNAL MEDICINE

## 2021-09-29 PROCEDURE — G8417 CALC BMI ABV UP PARAM F/U: HCPCS | Performed by: INTERNAL MEDICINE

## 2021-09-29 PROCEDURE — 99214 OFFICE O/P EST MOD 30 MIN: CPT | Performed by: INTERNAL MEDICINE

## 2021-09-29 PROCEDURE — G8432 DEP SCR NOT DOC, RNG: HCPCS | Performed by: INTERNAL MEDICINE

## 2021-09-29 RX ORDER — MYCOPHENOLIC ACID 360 MG/1
360 TABLET, DELAYED RELEASE ORAL 2 TIMES DAILY
COMMUNITY
Start: 2021-08-15

## 2021-09-29 RX ORDER — DOXEPIN HYDROCHLORIDE 3 MG/1
TABLET ORAL
COMMUNITY
Start: 2021-09-13

## 2021-09-29 RX ORDER — BUPRENORPHINE 10 UG/H
PATCH TRANSDERMAL
COMMUNITY
Start: 2021-09-04

## 2021-09-29 NOTE — PROGRESS NOTES
Hematology/Oncology  Progress Note     Name: Rhonda Villalobos  Date: 3/30/21  : 1970     None      Ms. Ladonna Garcia is a 44y.o. year old female who was seen for Antiphospholipid syndrome and recurrent DVT.    Current therapy: Eliquis 5 mg po twice daily        Subjective:   Ms. Ladonna Garcia is a 58-year-old woman who has a history of antiphospholipid syndrome and she has suffered from recurrent DVT, lupus, . Currently she is on long-term anticoagulation to reduce current risk for further venous thromboembolic episodes. She also has h/o on kidney transplant in . She is currently on prednisone, cyclosporine and Cellcept. She was asked by nephrologist to come here because recent H/H was low at 8.2/23. MCV=91  Last colonoscopy one year ago was normal she says. She is on lifelong anticoagulation therapy. She denies any repeated infection.  Denies  any fever, chills, no NV or abdominal pain. No melena or BRBPR. Has no much energy. Dyspnea and exertion and pagophagia have resolved. She does not eat popcorn every day anymore. All other points of ROS have been reviewed and were negative. ECOG PS 1  Past medical history, family history, and social history: these were reviewed and remains unchanged.         Past Medical History:   Diagnosis Date    Anemia     Anticardiolipin antibody positive     Asthma     Depression     Fibromyalgia     Hypertension     Lupus ([facility] [address]      Past Surgical History:   Procedure Laterality Date    HX HYSTERECTOMY      HX RENAL TRANSPLANT       Social History     Socioeconomic History    Marital status:      Spouse name: Not on file    Number of children: Not on file    Years of education: Not on file    Highest education level: Not on file   Tobacco Use    Smoking status: Never Smoker    Smokeless tobacco: Never Used   Substance and Sexual Activity    Alcohol use: Yes     Comment: occ    Drug use: Never    Sexual activity: Yes     Social Determinants of Health     Financial Resource Strain:     Difficulty of Paying Living Expenses:    Food Insecurity:     Worried About Running Out of Food in the Last Year:     [address] in the Last Year:    Transportation Needs:     Lack of Transportation (Medical):  Lack of Transportation (Non-Medical):    Physical Activity:     Days of Exercise per Week:     Minutes of Exercise per Session:    Stress:     Feeling of Stress :    Social Connections:     Frequency of Communication with Friends and Family:     Frequency of Social Gatherings with Friends and Family:     Attends [employer] Services:     Active Member of Clubs or Organizations:     Attends Club or Organization Meetings:     Marital Status:      Family History   Problem Relation Age of Onset    Hypertension Mother     Diabetes Father     Heart Disease Father     Hypertension Father     Stroke Father        Current Outpatient Medications   Medication Sig Dispense Refill    tapentadoL (Nucynta) 50 mg tablet Take 50 mg by mouth three (3) times daily.  fluconazole (DIFLUCAN PO) Take  by mouth. Fungal infection on bilateral feet/toes      Osphena 60 mg tab tablet 60 mg daily.  lisinopriL (PRINIVIL, ZESTRIL) 10 mg tablet 20 mg daily.  buPROPion XL (WELLBUTRIN XL) 150 mg tablet       apixaban (ELIQUIS) 5 mg tablet Take 1 Tab by mouth two (2) times a day. Indications: blood clot in a deep vein of the extremities 180 Tab 3    carvediloL (COREG) 6.25 mg tablet Take 1 Tab by mouth two (2) times a day. 60 Tab 1    ondansetron hcl (Zofran) 4 mg tablet Take 1 Tab by mouth every eight (8) hours as needed for Nausea or Vomiting. 15 Tab 0    DULoxetine (Cymbalta) 60 mg capsule Take 60 mg by mouth two (2) times a day.  ergocalciferol (Vitamin D2) 1,250 mcg (50,000 unit) capsule Take 50,000 Units by mouth every seven (7) days.  tiZANidine (ZANAFLEX) 4 mg tablet as needed.       predniSONE (STERAPRED) 5 mg dose pack prednisone 5 mg tablet   TAKE ONE TO TWO TABLETS BY MOUTH ONE TIME DAILY AS NEEDED FOR FLARE      albuterol (PROAIR HFA) 90 mcg/actuation inhaler ProAir HFA 90 mcg/actuation aerosol inhaler      cycloSPORINE (SANDIMMUNE) 100 mg capsule 75 mg two (2) times a day.  mycophenolate (CELLCEPT) 500 mg tablet 1,000 mg two (2) times a day.  pantoprazole (PROTONIX) 40 mg tablet pantoprazole 40 mg tablet,delayed release      predniSONE (DELTASONE) 5 mg tablet          Allergies   Allergen Reactions    Amoxicillin Itching    Celecoxib Other (comments) and Unknown (comments)    Cimetidine Other (comments)    Ciprofloxacin Itching    Sulfa (Sulfonamide Antibiotics) Itching    Vancomycin Itching       Review of Systems  As per HPI      Objective: There were no vitals taken for this visit. Physical Exam:   General appearance - alert, well appearing, and in no distress  Mental status - alert, oriented to person, place, and time  EYE-MATT, EOMI  ENT-ENT exam normal, no neck nodes or sinus tenderness  Mouth - mucous membranes moist, pharynx normal without lesions  Neck - supple, no significant adenopathy   Chest - clear to auscultation, no wheezes, rales or rhonchi, symmetric air entry   Heart - normal rate and regular rhythm   Abdomen - soft, nontender, nondistended, no masses or organomegaly  Lymph- no adenopathy palpable  Ext-no pedal edema noted  Skin-Warm and dry. Neuro -alert, oriented, normal speech, no focal findings or movement disorder noted          Diagnostic Imaging     No results found for this or any previous visit. Results for orders placed during the hospital encounter of 08/31/20    XR CHEST SNGL V    Narrative  Indication: ams, covid. .    Impression  IMPRESSION: Portable AP upright view the chest exposed at 12:05 PM August 31, 2020 reveals bilateral groundglass infiltrates diffusely in both lungs  compatible with pneumonia, most likely Covid type, new since February 18, 2020.   Differential considerations include pulmonary edema. The heart is top normal in  size. Atherosclerotic changes are present in the aorta. Cardiac device overlies  the left chest.      Results for orders placed during the hospital encounter of 06/22/21    CT CHEST WO CONT    Addendum 6/30/2021  4:27 PM  Corrected report    CT exams at this facility use one or more dose reduction techniques including  automatic exposure control, mA/kV adjustment per patient's size, or iterative  reconstruction technique. Clinical history:  Groundglass opacity, Lupus    Chest radiograph 8/31/2020    EXAMINATION:  1. CT scan of the chest without contrast 6/22/2021. 5 mm spiral scanning is  performed from the lung apices to the upper poles of the kidneys. Coronal and  sagittal reconstruction imaging has been obtained. 2. High-resolution supine and prone imaging    FINDINGS:    Electronic device subcutaneous tissues anterior left upper chest. There are  degenerative changes of the spine. Trachea, right and left mainstem bronchi are  patent. Scattered peripheral wedge-shaped groundglass opacities both lungs with mosaic  attenuation. No interstitial or septal thickening. No bronchiectasis. Findings  likely related to small airway disease. Visualized portions of the thyroid gland is unremarkable. There is a hiatal  hernia. Atherosclerotic calcification of the thoracic aorta. No lymph node  enlargement in the axilla, mediastinum or randolph. Calcified mediastinal and hilar  lymph nodes. Visualized portions of the liver, gallbladder,, pancreas and adrenal glands are  unremarkable. There are splenic granuloma. Old T11 vertebral body fracture. IMPRESSION:    1. Peripheral scattered groundglass opacities both lungs with mosaic attenuation  likely related to small airway disease. Findings may represent nonspecific  interstitial pneumonitis. 2. Hiatal hernia.     Narrative  All CT exams at this facility use one or more dose reduction techniques  including automatic exposure control, mA/kV adjustment per patient's size, or  iterative reconstruction technique. Clinical history:  Groundglass opacity, Lupus    Chest radiograph 8/31/2020    EXAMINATION:  1. CT scan of the chest without contrast 6/22/2021. 5 mm spiral scanning is  performed from the lung apices to the upper poles of the kidneys. Coronal and  sagittal reconstruction imaging has been obtained. 2. High-resolution supine and prone imaging    FINDINGS:    Electronic device subcutaneous tissues anterior left upper chest. There are  degenerative changes of the spine. Trachea, right and left mainstem bronchi are  patent. Centrilobular emphysema. Scattered peripheral wedge-shaped groundglass opacities both lungs with mosaic  attenuation. No interstitial or septal thickening. No bronchiectasis. Findings  likely related to small airway disease. Visualized portions of the thyroid gland is unremarkable. There is a hiatal  hernia. Atherosclerotic calcification of the thoracic aorta. No lymph node  enlargement in the axilla, mediastinum or randolph. Calcified mediastinal and hilar  lymph nodes. Visualized portions of the liver, gallbladder,, pancreas and adrenal glands are  unremarkable. There are splenic granuloma. Old T11 vertebral body fracture. Impression  IMPRESSION:  1. Centrilobular emphysema. 2. Peripheral scattered groundglass opacities both lungs with mosaic attenuation  likely related to small airway disease. Findings may represent nonspecific  interstitial pneumonitis. 3. Hiatal hernia.       Lab Results  Lab Results   Component Value Date/Time    WBC 9.7 03/19/2021 11:00 AM    HGB 10.9 (L) 03/19/2021 11:00 AM    HCT 34.7 (L) 03/19/2021 11:00 AM    PLATELET 176 69/56/3345 11:00 AM    MCV 94.8 03/19/2021 11:00 AM       Lab Results   Component Value Date/Time    Sodium 143 03/19/2021 11:00 AM    Potassium 4.2 03/19/2021 11:00 AM    Chloride 111 03/19/2021 11:00 AM    CO2 22 03/19/2021 11:00 AM    Anion gap 10 03/19/2021 11:00 AM    Glucose 97 03/19/2021 11:00 AM    BUN 25 (H) 03/19/2021 11:00 AM    Creatinine 1.78 (H) 03/19/2021 11:00 AM    BUN/Creatinine ratio 14 03/19/2021 11:00 AM    GFR est AA 36 (L) 03/19/2021 11:00 AM    GFR est non-AA 30 (L) 03/19/2021 11:00 AM    Calcium 9.0 03/19/2021 11:00 AM    Alk. phosphatase 102 02/17/2021 02:48 PM    Protein, total 7.1 02/17/2021 02:48 PM    Protein, total 6.7 02/17/2021 02:48 PM    Albumin 3.5 02/17/2021 02:48 PM    Globulin 3.6 02/17/2021 02:48 PM    A-G Ratio 1.0 02/17/2021 02:48 PM    ALT (SGPT) 17 02/17/2021 02:48 PM   Follow-up with PCP for health maintenance  Assessment/Plan:    ICD-10-CM ICD-9-CM    1. MGUS (monoclonal gammopathy of unknown significance)  D47.2 273.1    2. Elevated serum immunoglobulin free light chain level  R76.8 795.79      No orders of the defined types were placed in this encounter. Monoclonal gammopathy of undetermined significance: Patient was being followed by Dr. Vitaliy Wayne who retired for MGUS and recurrent DVT with antiphospholipid syndrome. She is on lifetime Eliquis. Labs on 2/17/2021 showed WBC 9.1, H&H 9.5/30.6, platelets 741. IgG 1257  IgA 100  IgM 130  Total protein 6.7, albumin 3.4, M spike 0.7, CARRIE showed IgG lambda restricted monoclonal protein. K/L ratio decreased at 0.06. CRP is elevated at 1.4, ESR elevated at 74. 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 NORMOCELLULAR MARROW WITH PROGRESSIVE, TRILINEAGE HEMATOPOIESIS. MILD NORMOCYTIC ANEMIA, NEUTROPHILIA AND LYMPHOPENIA. ADEQUATE STORAGE IRON. CONGO RED STAIN NEGATIVE FOR AMYLOID. Labs on 3/19/2021 showed WBC 9.7, there is already response to iron infusion and hemoglobin went from 9.5 to 10.9 after iron infusion. ANC 8.2, ALC 0.8.   Labs done on 9/22/2021 showed BUN 26, creatinine 1.6, ferritin 520, transferrin saturation 53%, vitamin B12 546, normal folate. WBC 5.7, H&H 12.6/39.3, . Normal differential.  ESR elevated at 66. K=23.8 mg/L (3.3-19.4), lambda free light chain 261 mg/L (5.7-26. 3) K/L0.09. Immunofixation showed IgG lambda restricted protein. Patient has increasing light chains  M spike 0.3 mg/dL  *Check 24 hours urine  *Refer to IR for kidney biopsy to r/o /in myeloma kidney-Worrying about light chain myeloma    Recurrent DVT and Antiphospholipid syndrome: No sign or symptoms of recurrence. Patient will continue taking the Eliquis 5 mg po twice daily. Once date is known she will need Lovenox bridge as follow:  -Stop Eliquis 3 days prior to biopsy  -Start Lovenox 1mg/kg same day Eliquis is stopped  -Stop Lovenox 24 hours before biopsy  -Resume both Lovenox and Eliquis about 12 hours after biopsy to overlap for 24 hours then stop Lovenox and continue Eliquis as previously    Immunocompromised: No repeated infections    Iron deficiency anemia/H/o kidney transplant:  She completed Injectafer 2/2 doses today 3/2/2021. In a matter of 2 weeks hemoglobin has responded to iron infusion and has increased from 9.5-10.9. Continue follow-up  Colonoscopy was done about one year ago in May 2020 and was normal.       RTC 4 weeks to follow-up with kidney biopsy and urine myeloma work-up.    have labs drawn prior to ROV  There are no Patient Instructions on file for this visit.        Kanchan Cuba MD

## 2021-09-30 ENCOUNTER — TELEPHONE (OUTPATIENT)
Age: 51
End: 2021-09-30

## 2021-09-30 RX ORDER — ENOXAPARIN SODIUM 100 MG/ML
INJECTION SUBCUTANEOUS
Qty: 7 ML | Refills: 0 | Status: SHIPPED | OUTPATIENT
Start: 2021-09-30 | End: 2021-10-06 | Stop reason: SDUPTHER

## 2021-09-30 NOTE — TELEPHONE ENCOUNTER
Patient stated Dr Jerilyn Russell was to send a prescription for Lovenox 1mg/kg  to her pharmacy, it wasn't sent yet

## 2021-10-01 ENCOUNTER — TELEPHONE (OUTPATIENT)
Age: 51
End: 2021-10-01

## 2021-10-01 DIAGNOSIS — I82.4Y1 ACUTE DEEP VEIN THROMBOSIS (DVT) OF PROXIMAL VEIN OF RIGHT LOWER EXTREMITY (HCC): ICD-10-CM

## 2021-10-01 DIAGNOSIS — R76.8 ELEVATED SERUM IMMUNOGLOBULIN FREE LIGHT CHAIN LEVEL: ICD-10-CM

## 2021-10-01 DIAGNOSIS — D50.9 IRON DEFICIENCY ANEMIA, UNSPECIFIED IRON DEFICIENCY ANEMIA TYPE: ICD-10-CM

## 2021-10-01 DIAGNOSIS — D47.2 MONOCLONAL GAMMOPATHY OF UNDETERMINED SIGNIFICANCE: ICD-10-CM

## 2021-10-01 DIAGNOSIS — D47.2 MGUS (MONOCLONAL GAMMOPATHY OF UNKNOWN SIGNIFICANCE): Primary | ICD-10-CM

## 2021-10-01 DIAGNOSIS — I82.90 VENOUS THROMBOEMBOLISM: ICD-10-CM

## 2021-10-01 NOTE — TELEPHONE ENCOUNTER
Spoke with Pt to advise Bx has been scheduled. Schedule is as follows: Pt will have Bx with [address] [place] [address] [facility] on 10/13. Check-in  is 6:30am for an 8:00am procedure time. Pt  is aware to be NPO after midnight. Pt aware she must have a . Pt aware to hold blood thinners 5 days prior to procedure as requested by Dev VALDEZ. Pt advised that she will be admitted in [address] [place] [address] for a 23 hour overnight stay and she must complete cbc,cmp, pt-inr with [address] [place] [address] [facility] at least one week prior to procedure, as requested by [address] [place] [address] [facility]. Lab orders have been faxed to [address] [place] [address] [facility] outpatient diagnostics labs [phone]. Lovenox has been sent to Pt's preferred pharmacy and Pt is aware. No further questions at this time. Pt was provided with [facility]note 3[facility] phone number [phone].

## 2021-10-04 ENCOUNTER — TELEPHONE (OUTPATIENT)
Age: 51
End: 2021-10-04

## 2021-10-04 NOTE — TELEPHONE ENCOUNTER
Pt called today [facility] is requesting Pt hold eliquis 5 days before Bx.  Pt is stating that she will need more lovenox injectons as she was only given 10 and believes she will need 14. -Please advise

## 2021-10-06 ENCOUNTER — TELEPHONE (OUTPATIENT)
Age: 51
End: 2021-10-06

## 2021-10-06 RX ORDER — ENOXAPARIN SODIUM 100 MG/ML
INJECTION SUBCUTANEOUS
Qty: 12 ML | Refills: 0 | Status: SHIPPED | OUTPATIENT
Start: 2021-10-06

## 2021-10-06 RX ORDER — ENOXAPARIN SODIUM 100 MG/ML
INJECTION SUBCUTANEOUS
Qty: 7 ML | Refills: 0 | Status: SHIPPED | OUTPATIENT
Start: 2021-10-06 | End: 2021-10-06 | Stop reason: SDUPTHER

## 2021-10-06 NOTE — TELEPHONE ENCOUNTER
Patient is stating [facility] is advising her to stop Eliquis on 10/08/21, and start taking Lovenox 2 injection a day until 10/12/21 Tuesday morning which is 24 hrs before her surgery, and she is to start taking Lovenox and Eliquis again on 10/13     She is going to stop taking Lovenox on 10/15 and continue Eliquis    She is stating she needs more Lovenox order

## 2021-11-11 ENCOUNTER — OFFICE VISIT (OUTPATIENT)
Age: 51
End: 2021-11-11
Payer: MEDICARE

## 2021-11-11 VITALS
SYSTOLIC BLOOD PRESSURE: 142 MMHG | TEMPERATURE: 97.3 F | DIASTOLIC BLOOD PRESSURE: 90 MMHG | HEIGHT: 69 IN | BODY MASS INDEX: 37 KG/M2 | OXYGEN SATURATION: 95 % | WEIGHT: 249.8 LBS | RESPIRATION RATE: 19 BRPM | HEART RATE: 76 BPM

## 2021-11-11 DIAGNOSIS — D47.2 MGUS (MONOCLONAL GAMMOPATHY OF UNKNOWN SIGNIFICANCE): Primary | ICD-10-CM

## 2021-11-11 DIAGNOSIS — I82.4Y1 ACUTE DEEP VEIN THROMBOSIS (DVT) OF PROXIMAL VEIN OF RIGHT LOWER EXTREMITY (HCC): ICD-10-CM

## 2021-11-11 DIAGNOSIS — D68.61 ANTIPHOSPHOLIPID SYNDROME (HCC): ICD-10-CM

## 2021-11-11 DIAGNOSIS — R76.8 ELEVATED SERUM IMMUNOGLOBULIN FREE LIGHT CHAIN LEVEL: ICD-10-CM

## 2021-11-11 PROCEDURE — G0463 HOSPITAL OUTPT CLINIC VISIT: HCPCS | Performed by: INTERNAL MEDICINE

## 2021-11-11 PROCEDURE — 99213 OFFICE O/P EST LOW 20 MIN: CPT | Performed by: INTERNAL MEDICINE

## 2021-11-11 NOTE — PROGRESS NOTES
Hematology/Oncology Progress Note    Name: Shann Spurling  : 1970    Ms. Lindsay Thacker is a 46y.o. year old female who was seen for   Chief Complaint   Patient presents with    Monoclonal Gammopathy Of Unknown Significance        DX:  Ms. Garsia is a 44y.o. year old female who was seen for Antiphospholipid syndrome and recurrent DVT.    Current therapy: Eliquis 5 mg po twice daily      Subjective:     Patient is off and on complaining of generalized malaise and weakness,but denies shortness of breath, chest pain, nausea,  vomiting,  diarrhea, abdominal pain,  melena, fever , night sweats , or weight loss. Current Outpatient Medications   Medication Sig Dispense Refill    enoxaparin (LOVENOX) 100 mg/mL Hold eliquis for 5 days prior to biopsy. Start Lovenox 100mg SQ twice a day prior to biopsy. Stop Lovenox 24hrs before biopsy. Resume eliquis with Lovenox 12hrs after biopsy. Overlap both for 24hrs then stop Lovenox and cont eliquis as previously. 12 mL 0    mycophenolate sodium (MYFORTIC) 360 mg delayed release tablet Take 360 mg by mouth two (2) times a day.  Doxepin 3 mg tab       buprenorphine (BUTRANS) 10 mcg/hour apply 1 patch topically to CLEAN, DRY, AND INTACT SKIN REMOVE AND REPLACE every 7 days      tapentadoL (Nucynta) 50 mg tablet Take 50 mg by mouth three (3) times daily. Not taking      fluconazole (DIFLUCAN PO) Take  by mouth. Fungal infection on bilateral feet/toes      Osphena 60 mg tab tablet 60 mg daily.  lisinopriL (PRINIVIL, ZESTRIL) 10 mg tablet 20 mg daily.  buPROPion XL (WELLBUTRIN XL) 150 mg tablet       apixaban (ELIQUIS) 5 mg tablet Take 1 Tab by mouth two (2) times a day. Indications: blood clot in a deep vein of the extremities 180 Tab 3    carvediloL (COREG) 6.25 mg tablet Take 1 Tab by mouth two (2) times a day.  (Patient taking differently: Take 12.5 mg by mouth two (2) times a day.) 60 Tab 1    ondansetron hcl (Zofran) 4 mg tablet Take 1 Tab by mouth every eight (8) hours as needed for Nausea or Vomiting. 15 Tab 0    DULoxetine (Cymbalta) 60 mg capsule Take 60 mg by mouth two (2) times a day. NOT TAKING      ergocalciferol (Vitamin D2) 1,250 mcg (50,000 unit) capsule Take 50,000 Units by mouth every seven (7) days.  tiZANidine (ZANAFLEX) 4 mg tablet as needed.  predniSONE (STERAPRED) 5 mg dose pack prednisone 5 mg tablet   TAKE ONE TO TWO TABLETS BY MOUTH ONE TIME DAILY AS NEEDED FOR FLARE      albuterol (PROAIR HFA) 90 mcg/actuation inhaler ProAir HFA 90 mcg/actuation aerosol inhaler      cycloSPORINE (SANDIMMUNE) 100 mg capsule 75 mg two (2) times a day. Not Taking      mycophenolate (CELLCEPT) 500 mg tablet 1,000 mg two (2) times a day.  Not taking      pantoprazole (PROTONIX) 40 mg tablet pantoprazole 40 mg tablet,delayed release      predniSONE (DELTASONE) 5 mg tablet          Past Medical History:   Diagnosis Date    Anemia     Anticardiolipin antibody positive     Asthma     Depression     Fibromyalgia     Hypertension     Lupus (HCC)      Past Surgical History:   Procedure Laterality Date    HX HYSTERECTOMY      HX RENAL TRANSPLANT  1994     Social History     Socioeconomic History    Marital status:      Spouse name: Not on file    Number of children: Not on file    Years of education: Not on file    Highest education level: Not on file   Occupational History    Not on file   Tobacco Use    Smoking status: Never Smoker    Smokeless tobacco: Never Used   Substance and Sexual Activity    Alcohol use: Yes     Comment: occ    Drug use: Never    Sexual activity: Yes   Other Topics Concern    Not on file   Social History Narrative    Not on file     Social Determinants of Health     Financial Resource Strain:     Difficulty of Paying Living Expenses: Not on file   Food Insecurity:     Worried About Running Out of Food in the Last Year: Not on file    [place] of Food in the Last Year: Not on file Transportation Needs:     Lack of Transportation (Medical): Not on file    Lack of Transportation (Non-Medical): Not on file   Physical Activity:     Days of Exercise per Week: Not on file    Minutes of Exercise per Session: Not on file   Stress:     Feeling of Stress : Not on file   Social Connections:     Frequency of Communication with Friends and Family: Not on file    Frequency of Social Gatherings with Friends and Family: Not on file    Attends Hoahaoism Services: Not on file    Active Member of [address] [place] [address] or Organizations: Not on file    Attends Club or Organization Meetings: Not on file    Marital Status: Not on file   Intimate Partner Violence:     Fear of Current or Ex-Partner: Not on file    Emotionally Abused: Not on file    Physically Abused: Not on file    Sexually Abused: Not on file   Housing Stability:     Unable to Pay for Housing in the Last Year: Not on file    Number of [place] in the Last Year: Not on file    Unstable Housing in the Last Year: Not on file     Family History   Problem Relation Age of Onset    Hypertension Mother     Diabetes Father     Heart Disease Father     Hypertension Father     Stroke Father        Review of Systems  Constitutional: negative for fevers, chills, sweats, +  fatigue  Eyes: negative for visual disturbance, redness, eye pain and discharge. Ears, nose, mouth, throat, and face: negative for hearing loss, ear drainage, nasal congestion, sore throat, sinus pressure, pain in and around ear.   Respiratory: negative for cough, sputum, hemoptysis, pleurisy/chest pain, wheezing or dyspnea on exertion  Cardiovascular: negative for chest pain, dyspnea, palpitations, irregular heart beats, syncope, dizziness  Gastrointestinal: negative for dysphagia, dyspepsia, nausea, vomiting, change in bowel habits, melena, diarrhea, constipation, abdominal pain and jaundice  Genitourinary:negative for frequency, dysuria, hesitancy, hematuria and urinary retention. Hematologic/lymphatic: negative for easy bruising, bleeding, lymphadenopathy and petechiae  Musculoskeletal:negative for arthralgias, neck pain, back pain, muscle weakness and bone pain  Neurological: negative for headaches, vertigo, seizures, memory problems, speech problems, gait problems, tremor, weakness and gait disturbance. Endocrine: negative for diabetic symptoms including polyuria, polydipsia, pruritus and intolerance to heat and cold. Allergic/Immunologic: negative for contact allergy. Objective:       Visit Vitals  BP (!) 142/90   Pulse 76   Temp 97.3 °F (36.3 °C)   Resp 19   Ht 5' 9\" (1.753 m)   Wt 113.3 kg (249 lb 12.8 oz)   SpO2 95%   BMI 36.89 kg/m²         Physical Exam: General appearance: alert, cooperative, no distress, appears stated age  Ears: Hearing grossly normal.  Nose: Nares normal. Septum midline. Mucosa normal. No drainage or sinus tenderness. Throat: Lips, mucosa, and tongue normal. Teeth and gums normal  Lungs: clear to auscultation bilaterally  Heart: regular rate and rhythm, S1, S2 normal, no murmur, click, rub or gallop  Abdomen: soft, non-tender. Bowel sounds normal. No masses,  no organomegaly  Extremities: no edema, redness or tenderness in the calves or thighs  Skin: Skin color, texture, turgor normal. No rashes or lesions  Neurologic: Alert and oriented X 3, normal strength and tone. Normal symmetric reflexes.  Normal coordination and gait        Results for orders placed or performed during the hospital encounter of 69/27/25   METABOLIC PANEL, BASIC   Result Value Ref Range    Sodium 143 136 - 145 mmol/L    Potassium 4.2 3.5 - 5.5 mmol/L    Chloride 111 100 - 111 mmol/L    CO2 22 21 - 32 mmol/L    Anion gap 10 3.0 - 18 mmol/L    Glucose 97 74 - 99 mg/dL    BUN 25 (H) 7.0 - 18 MG/DL    Creatinine 1.78 (H) 0.6 - 1.3 MG/DL    BUN/Creatinine ratio 14 12 - 20      GFR est AA 36 (L) >60 ml/min/1.73m2    GFR est non-AA 30 (L) >60 ml/min/1.73m2    Calcium 9.0 8.5 - 10.1 MG/DL   PROTHROMBIN TIME + INR   Result Value Ref Range    Prothrombin time 13.5 11.5 - 15.2 sec    INR 1.0 0.8 - 1.2     PTT   Result Value Ref Range    aPTT 23.5 23.0 - 36.4 SEC   CBC WITH AUTOMATED DIFF   Result Value Ref Range    WBC 9.7 4.6 - 13.2 K/uL    RBC 3.66 (L) 4.20 - 5.30 M/uL    HGB 10.9 (L) 12.0 - 16.0 g/dL    HCT 34.7 (L) 35.0 - 45.0 %    MCV 94.8 74.0 - 97.0 FL    MCH 29.8 24.0 - 34.0 PG    MCHC 31.4 31.0 - 37.0 g/dL    RDW 21.3 (H) 11.6 - 14.5 %    PLATELET 119 124 - 595 K/uL    MPV 10.2 9.2 - 11.8 FL    NEUTROPHILS 85 (H) 40 - 73 %    LYMPHOCYTES 8 (L) 21 - 52 %    MONOCYTES 6 3 - 10 %    EOSINOPHILS 1 0 - 5 %    BASOPHILS 0 0 - 2 %    ABS. NEUTROPHILS 8.2 (H) 1.8 - 8.0 K/UL    ABS. LYMPHOCYTES 0.8 (L) 0.9 - 3.6 K/UL    ABS. MONOCYTES 0.6 0.05 - 1.2 K/UL    ABS. EOSINOPHILS 0.1 0.0 - 0.4 K/UL    ABS. BASOPHILS 0.0 0.0 - 0.1 K/UL    DF AUTOMATED           Assessment:     1. MGUS (monoclonal gammopathy of unknown significance)    2. Elevated serum immunoglobulin free light chain level    3. Acute deep vein thrombosis (DVT) of proximal vein of right lower extremity (HCC)    4. [facility]osph[facility] syndrome ([facility]          Plan:     1. MGUS (monoclonal gammopathy of unknown significance)  D47.2 273.1     2. Elevated serum immunoglobulin free light chain level  R76.8 795.79        No orders of the defined types were placed in this encounter. Monoclonal gammopathy of undetermined significance: Patient was being followed by Dr. Arnie Molina who retired for MGUS and recurrent DVT with antiphospholipid syndrome. She is on lifetime Eliquis. Labs on 2/17/2021 showed WBC 9.1, H&H 9.5/30.6, platelets 912. IgG 1257  IgA 100  IgM 130  Total protein 6.7, albumin 3.4, M spike 0.7, CARRIE showed IgG lambda restricted monoclonal protein. K/L ratio decreased at 0.06. CRP is elevated at 1.4, ESR elevated at 74.   *Bone marrow biopsy done on 3/19/2021 showed  PERIPHERAL BLOOD SMEAR, BONE MARROW ASPIRATE AND BIOPSY: PLASMA CELL NEOPLASM WITH LESS THAN 5% BONE MARROW PLASMA CELLS. NORMOCELLULAR MARROW WITH PROGRESSIVE, TRILINEAGE HEMATOPOIESIS. MILD NORMOCYTIC ANEMIA, NEUTROPHILIA AND LYMPHOPENIA. ADEQUATE STORAGE IRON. [place]GO RED STAIN NEGATIVE FOR AMYLOID.      Labs on 3/19/2021 showed WBC 9.7, there is already response to iron infusion and hemoglobin went from 9.5 to 10.9 after iron infusion. ANC 8.2, ALC 0.8. Labs done on 9/22/2021 showed BUN 26, creatinine 1.6, ferritin 520, transferrin saturation 53%, vitamin B12 546, normal folate. WBC 5.7, H&H 12.6/39.3, . Normal differential.  ESR elevated at 66. K=23.8 mg/L (3.3-19.4), lambda free light chain 261 mg/L (5.7-26. 3) K/L0.09. Immunofixation showed IgG lambda restricted protein. 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 *Check 24 hours urine for protein and nephrology F/U.  *Bone survey to r/o lytic lesions. *Bone marrow showed no evidence of multiple myeloma. *Repeat free light chains.       Recurrent DVT and Antiphospholipid syndrome: No sign or symptoms of recurrence. Patient will continue taking the Eliquis 5 mg po twice daily. Once date is known she will need Lovenox bridge as follow:  -Stop Eliquis 3 days prior to biopsy  -Start Lovenox 1mg/kg same day Eliquis is stopped  -Stop Lovenox 24 hours before biopsy  -Resume both Lovenox and Eliquis about 12 hours after biopsy to overlap for 24 hours then stop Lovenox and continue Eliquis as previously     Immunocompromised: No repeated infections     Iron deficiency anemia/H/o kidney transplant:  She completed Injectafer 2/2 doses today 3/2/2021. In a matter of 2 weeks hemoglobin has responded to iron infusion and has increased from 9.5-10.9.   Continue follow-up  Colonoscopy was done about one year ago in May 2020 and was normal.        Orders Placed This Encounter    XR BONE SURVEY COMP     Standing Status:   Future     Standing Expiration Date:   5/11/2023     Order Specific Question:   Reason for Exam     Answer:   eval for myeloma lesions    PROTEIN, URINE, 24 HR     Standing Status:   Future     Standing Expiration Date:   5/11/2023    PROTEIN ELECTROPHORESIS + CARRIE, UR, 24HR     Standing Status:   Future     Standing Expiration Date:   11/11/2022    CBC WITH AUTOMATED DIFF     Standing Status:   Future     Standing Expiration Date:   08/77/9089    METABOLIC PANEL, COMPREHENSIVE     Standing Status:   Future     Standing Expiration Date:   5/11/2023    FREE LIGHT CHAINS, KAPPA/LAMBDA, UR, QT(BENCE-ANGUIANO)     Standing Status:   Future     Standing Expiration Date:   5/11/2023    IMMUNOGLOBULINS, G/A/M, QT.      Standing Status:   Future     Standing Expiration Date:   5/11/2023    BETA-2 MICROGLOBULIN     Standing Status:   Future     Standing Expiration Date:   5/11/2023       Ron Mccarthy MD  11/11/2021

## 2021-11-18 DIAGNOSIS — D47.2 MGUS (MONOCLONAL GAMMOPATHY OF UNKNOWN SIGNIFICANCE): ICD-10-CM

## 2021-11-23 ENCOUNTER — TELEPHONE (OUTPATIENT)
Age: 51
End: 2021-11-23

## 2021-11-23 NOTE — TELEPHONE ENCOUNTER
Left a message for patient to call office back in regards to bone dex. Asked patient to let us know where she would like us to fax the order too.

## 2021-11-24 ENCOUNTER — TELEPHONE (OUTPATIENT)
Age: 51
End: 2021-11-24

## 2021-11-24 NOTE — TELEPHONE ENCOUNTER
Patient called back stating she would like for her Bone Dex to be done at [facility] on [address].  Patient given [facility] central scheduling number

## 2022-01-03 DIAGNOSIS — I82.90 VENOUS THROMBOEMBOLISM: Primary | ICD-10-CM

## 2022-01-18 PROBLEM — D68.61 ANTIPHOSPHOLIPID SYNDROME (HCC): Status: ACTIVE | Noted: 2022-01-18

## 2022-01-18 PROBLEM — I82.90 VENOUS THROMBOEMBOLISM: Status: ACTIVE | Noted: 2022-01-18

## 2022-02-01 ENCOUNTER — VIRTUAL VISIT (OUTPATIENT)
Age: 52
End: 2022-02-01
Payer: MEDICARE

## 2022-02-01 DIAGNOSIS — N18.30 STAGE 3 CHRONIC KIDNEY DISEASE, UNSPECIFIED WHETHER STAGE 3A OR 3B CKD (HCC): ICD-10-CM

## 2022-02-01 DIAGNOSIS — Z94.0 KIDNEY TRANSPLANT RECIPIENT: ICD-10-CM

## 2022-02-01 DIAGNOSIS — D47.2 MGUS (MONOCLONAL GAMMOPATHY OF UNKNOWN SIGNIFICANCE): Primary | ICD-10-CM

## 2022-02-01 DIAGNOSIS — I82.4Y1 ACUTE DEEP VEIN THROMBOSIS (DVT) OF PROXIMAL VEIN OF RIGHT LOWER EXTREMITY (HCC): ICD-10-CM

## 2022-02-01 DIAGNOSIS — R76.0 ANTICARDIOLIPIN ANTIBODY POSITIVE: ICD-10-CM

## 2022-02-01 PROCEDURE — 99443 PR PHYS/QHP TELEPHONE EVALUATION 21-30 MIN: CPT | Performed by: INTERNAL MEDICINE

## 2022-02-01 NOTE — PROGRESS NOTES
Cristel Sequeira is a 46 y.o. female, evaluated via audio-only technology on 2/1/2022 for MGUS and APS. Patient has a transplanted kidney. Assessment & Plan:   Patient's MGUS labs are essentially stable. Patient had a skeletal survey to look for lytic lesions and none were identified. Neck patient does have some renal insufficiency. Plan at this time is to repeat. MGUS labs in 1 year and visit at that time. In the interval patient will follow up with his other physicians including nephrology and primary care physician. Should there be issues which arise in the interval will be happy to see the patient. Patient had opportunity to ask questions which were answered. Patient is in agreement with this plan  12  Subjective:       Prior to Admission medications    Medication Sig Start Date End Date Taking? Authorizing Provider   apixaban (ELIQUIS) 5 mg tablet Take 1 Tablet by mouth two (2) times a day. Indications: blood clot in a deep vein of the extremities 1/3/22   Amie Moeller MD   enoxaparin (LOVENOX) 100 mg/mL Hold eliquis for 5 days prior to biopsy. Start Lovenox 100mg SQ twice a day prior to biopsy. Stop Lovenox 24hrs before biopsy. Resume eliquis with Lovenox 12hrs after biopsy. Overlap both for 24hrs then stop Lovenox and cont eliquis as previously. 10/6/21   Devang Pearson, JUAN   mycophenolate sodium (MYFORTIC) 360 mg delayed release tablet Take 360 mg by mouth two (2) times a day. 8/15/21   Provider, Historical   Doxepin 3 mg tab  9/13/21   Provider, Historical   buprenorphine (BUTRANS) 10 mcg/hour apply 1 patch topically to CLEAN, DRY, AND INTACT SKIN REMOVE AND REPLACE every 7 days 9/4/21   Provider, Historical   tapentadoL (Nucynta) 50 mg tablet Take 50 mg by mouth three (3) times daily. Not taking    Provider, Historical   fluconazole (DIFLUCAN PO) Take  by mouth. Fungal infection on bilateral feet/toes    Provider, Historical   Osphena 60 mg tab tablet 60 mg daily.  12/25/20   Provider, Historical   lisinopriL (PRINIVIL, ZESTRIL) 10 mg tablet 20 mg daily. 12/16/20   Provider, Historical   buPROPion XL (WELLBUTRIN XL) 150 mg tablet  2/16/21   Provider, Historical   carvediloL (COREG) 6.25 mg tablet Take 1 Tab by mouth two (2) times a day. Patient taking differently: Take 12.5 mg by mouth two (2) times a day. 9/9/20   Thuy Jones MD   ondansetron hcl (Zofran) 4 mg tablet Take 1 Tab by mouth every eight (8) hours as needed for Nausea or Vomiting. 9/9/20   Erlin Pizarro MD   DULoxetine (Cymbalta) 60 mg capsule Take 60 mg by mouth two (2) times a day. NOT TAKING    Provider, Historical   ergocalciferol (Vitamin D2) 1,250 mcg (50,000 unit) capsule Take 50,000 Units by mouth every seven (7) days. Provider, Historical   tiZANidine (ZANAFLEX) 4 mg tablet as needed. 9/23/19   Provider, Historical   predniSONE (STERAPRED) 5 mg dose pack prednisone 5 mg tablet   TAKE ONE TO TWO TABLETS BY MOUTH ONE TIME DAILY AS NEEDED FOR FLARE    Provider, Historical   albuterol (PROAIR HFA) 90 mcg/actuation inhaler ProAir HFA 90 mcg/actuation aerosol inhaler    Provider, Historical   cycloSPORINE (SANDIMMUNE) 100 mg capsule 75 mg two (2) times a day. Not Taking    Provider, Historical   mycophenolate (CELLCEPT) 500 mg tablet 1,000 mg two (2) times a day.  Not taking    Provider, Historical   pantoprazole (PROTONIX) 40 mg tablet pantoprazole 40 mg tablet,delayed release    Provider, Historical   predniSONE (DELTASONE) 5 mg tablet  6/7/19   Provider, Historical     Patient Active Problem List   Diagnosis Code    Closed fracture of proximal end of humerus S42.209A    Fracture of distal end of fibula S82.839A    Anticardiolipin antibody positive R76.0    Sepsis ([facility] A41.9    VEENA (acute kidney injury) ([facility] N17.9    COVID-19 virus infection U07.1    Acute deep vein thrombosis (DVT) of proximal vein of right lower extremity (HCC) I82.4Y1    Iron deficiency anemia D50.9    MGUS (monoclonal gammopathy of unknown significance) D47.2    Venous thromboembolism I82.90    Antiphospholipid syndrome (HCC) D68.61     Current Outpatient Medications   Medication Sig Dispense Refill    apixaban (ELIQUIS) 5 mg tablet Take 1 Tablet by mouth two (2) times a day. Indications: blood clot in a deep vein of the extremities 180 Tablet 3    enoxaparin (LOVENOX) 100 mg/mL Hold eliquis for 5 days prior to biopsy. Start Lovenox 100mg SQ twice a day prior to biopsy. Stop Lovenox 24hrs before biopsy. Resume eliquis with Lovenox 12hrs after biopsy. Overlap both for 24hrs then stop Lovenox and cont eliquis as previously. 12 mL 0    mycophenolate sodium (MYFORTIC) 360 mg delayed release tablet Take 360 mg by mouth two (2) times a day.  Doxepin 3 mg tab       buprenorphine (BUTRANS) 10 mcg/hour apply 1 patch topically to CLEAN, DRY, AND INTACT SKIN REMOVE AND REPLACE every 7 days      tapentadoL (Nucynta) 50 mg tablet Take 50 mg by mouth three (3) times daily. Not taking      fluconazole (DIFLUCAN PO) Take  by mouth. Fungal infection on bilateral feet/toes      Osphena 60 mg tab tablet 60 mg daily.  lisinopriL (PRINIVIL, ZESTRIL) 10 mg tablet 20 mg daily.  buPROPion XL (WELLBUTRIN XL) 150 mg tablet       carvediloL (COREG) 6.25 mg tablet Take 1 Tab by mouth two (2) times a day. (Patient taking differently: Take 12.5 mg by mouth two (2) times a day.) 60 Tab 1    ondansetron hcl (Zofran) 4 mg tablet Take 1 Tab by mouth every eight (8) hours as needed for Nausea or Vomiting. 15 Tab 0    DULoxetine (Cymbalta) 60 mg capsule Take 60 mg by mouth two (2) times a day. NOT TAKING      ergocalciferol (Vitamin D2) 1,250 mcg (50,000 unit) capsule Take 50,000 Units by mouth every seven (7) days.  tiZANidine (ZANAFLEX) 4 mg tablet as needed.       predniSONE (STERAPRED) 5 mg dose pack prednisone 5 mg tablet   TAKE ONE TO TWO TABLETS BY MOUTH ONE TIME DAILY AS NEEDED FOR FLARE      albuterol (PROAIR HFA) 90 mcg/actuation inhaler ProAir HFA 90 mcg/actuation aerosol inhaler      cycloSPORINE (SANDIMMUNE) 100 mg capsule 75 mg two (2) times a day. Not Taking      mycophenolate (CELLCEPT) 500 mg tablet 1,000 mg two (2) times a day. Not taking      pantoprazole (PROTONIX) 40 mg tablet pantoprazole 40 mg tablet,delayed release      predniSONE (DELTASONE) 5 mg tablet        Allergies   Allergen Reactions    Amoxicillin Itching    Celecoxib Other (comments) and Unknown (comments)    Cimetidine Other (comments)    Ciprofloxacin Itching    Sulfa (Sulfonamide Antibiotics) Itching    Vancomycin Itching     Past Medical History:   Diagnosis Date    Anemia     Anticardiolipin antibody positive     Asthma     Depression     Fibromyalgia     Hypertension     Lupus ([facility]            Patient-Reported Vitals 2/1/2022   Patient-Reported Weight 250lb       Alexx Chavez, who was evaluated through a patient-initiated, synchronous (real-time) audio only encounter, and/or her healthcare decision maker, is aware that it is a billable service, with coverage as determined by her insurance carrier. She provided verbal consent to proceed: Yes. She has not had a related appointment within my department in the past 7 days or scheduled within the next 24 hours. On this date 02/01/2022 I have spent 26 minutes reviewing previous notes, test results and face to face (virtual) with the patient discussing the diagnosis and importance of compliance with the treatment plan as well as documenting on the day of the visit.     Emery Verdin MD